# Patient Record
Sex: MALE | Race: WHITE | Employment: FULL TIME | URBAN - METROPOLITAN AREA
[De-identification: names, ages, dates, MRNs, and addresses within clinical notes are randomized per-mention and may not be internally consistent; named-entity substitution may affect disease eponyms.]

---

## 2018-03-15 ENCOUNTER — APPOINTMENT (OUTPATIENT)
Dept: GENERAL RADIOLOGY | Age: 48
End: 2018-03-15
Payer: COMMERCIAL

## 2018-03-15 ENCOUNTER — HOSPITAL ENCOUNTER (EMERGENCY)
Age: 48
Discharge: HOME | End: 2018-03-15
Attending: EMERGENCY MEDICINE
Payer: COMMERCIAL

## 2018-03-15 VITALS
OXYGEN SATURATION: 97 % | BODY MASS INDEX: 25.06 KG/M2 | DIASTOLIC BLOOD PRESSURE: 99 MMHG | SYSTOLIC BLOOD PRESSURE: 165 MMHG | RESPIRATION RATE: 16 BRPM | HEART RATE: 99 BPM | HEIGHT: 72 IN | TEMPERATURE: 98.2 F | WEIGHT: 185 LBS

## 2018-03-15 DIAGNOSIS — S60.022A CONTUSION OF LEFT INDEX FINGER WITHOUT DAMAGE TO NAIL, INITIAL ENCOUNTER: Primary | ICD-10-CM

## 2018-03-15 PROCEDURE — 6370000000 HC RX 637 (ALT 250 FOR IP): Performed by: EMERGENCY MEDICINE

## 2018-03-15 PROCEDURE — 99283 EMERGENCY DEPT VISIT LOW MDM: CPT

## 2018-03-15 PROCEDURE — 73130 X-RAY EXAM OF HAND: CPT

## 2018-03-15 RX ORDER — HYDROCODONE BITARTRATE AND ACETAMINOPHEN 5; 325 MG/1; MG/1
1 TABLET ORAL ONCE
Status: COMPLETED | OUTPATIENT
Start: 2018-03-15 | End: 2018-03-15

## 2018-03-15 RX ADMIN — HYDROCODONE BITARTRATE AND ACETAMINOPHEN 1 TABLET: 5; 325 TABLET ORAL at 07:32

## 2018-03-15 ASSESSMENT — PAIN DESCRIPTION - ORIENTATION: ORIENTATION: LEFT

## 2018-03-15 ASSESSMENT — PAIN DESCRIPTION - PAIN TYPE: TYPE: ACUTE PAIN

## 2018-03-15 ASSESSMENT — PAIN DESCRIPTION - ONSET: ONSET: ON-GOING

## 2018-03-15 ASSESSMENT — PAIN SCALES - GENERAL
PAINLEVEL_OUTOF10: 8
PAINLEVEL_OUTOF10: 8

## 2018-03-15 ASSESSMENT — PAIN DESCRIPTION - LOCATION: LOCATION: HAND

## 2018-03-15 ASSESSMENT — PAIN DESCRIPTION - DESCRIPTORS: DESCRIPTORS: THROBBING

## 2018-03-15 NOTE — ED NOTES
Finger splint applied to the patients injured left index finger, with tape.      Ciara Kelly, HENRIETTA  03/15/18 [id number]

## 2018-03-18 ENCOUNTER — HOSPITAL ENCOUNTER (EMERGENCY)
Age: 48
Discharge: HOME | End: 2018-03-18
Attending: EMERGENCY MEDICINE
Payer: COMMERCIAL

## 2018-03-18 VITALS
OXYGEN SATURATION: 97 % | SYSTOLIC BLOOD PRESSURE: 132 MMHG | DIASTOLIC BLOOD PRESSURE: 88 MMHG | HEART RATE: 98 BPM | BODY MASS INDEX: 25.06 KG/M2 | RESPIRATION RATE: 14 BRPM | WEIGHT: 185 LBS | HEIGHT: 72 IN | TEMPERATURE: 98.7 F

## 2018-03-18 DIAGNOSIS — L08.9 INFECTED FINGER: Primary | ICD-10-CM

## 2018-03-18 PROCEDURE — 97597 DBRDMT OPN WND 1ST 20 CM/<: CPT

## 2018-03-18 PROCEDURE — 99282 EMERGENCY DEPT VISIT SF MDM: CPT

## 2018-03-18 RX ORDER — CEPHALEXIN 500 MG/1
500 CAPSULE ORAL 4 TIMES DAILY
Qty: 40 CAPSULE | Refills: 0 | Status: SHIPPED | OUTPATIENT
Start: 2018-03-18 | End: 2018-03-28

## 2018-03-18 RX ORDER — DIAPER,BRIEF,INFANT-TODD,DISP
EACH MISCELLANEOUS
Status: DISCONTINUED
Start: 2018-03-18 | End: 2018-03-18 | Stop reason: WASHOUT

## 2018-03-18 ASSESSMENT — PAIN SCALES - GENERAL: PAINLEVEL_OUTOF10: 1

## 2018-03-18 ASSESSMENT — PAIN DESCRIPTION - ORIENTATION: ORIENTATION: LEFT

## 2018-03-18 NOTE — ED PROVIDER NOTES
HPI:  3/18/18,   Time: 2:25 PM         Cherelle Ngo is a 56 y.o. male presenting to the ED for pain and swelling of the left index finger, beginning 1-2d ago. The complaint has been persistent, severe in severity, and worsened by movement of the finger. The patient states that he sustained a direct blow to the left index finger while at work 4 d ago. He was seen that same day at an urgent care center and an x-ray showed no fractures he has kept the finger dressed since then and he noticed that the finger has become more swollen and painful    ROS:   Pertinent positives and negatives are stated within HPI, all other systems reviewed and are negative.  --------------------------------------------- PAST HISTORY ---------------------------------------------  Past Medical History:  has a past medical history of Hernia. Past Surgical History:  has a past surgical history that includes Mandible fracture surgery. Social History:  reports that he has been smoking. He has a 27.00 pack-year smoking history. He uses smokeless tobacco. He reports that he drinks alcohol. He reports that he uses drugs, including Marijuana. Family History: family history is not on file. The patients home medications have been reviewed. Allergies: Patient has no known allergies. -------------------------------------------------- RESULTS -------------------------------------------------  All laboratory and radiology results have been personally reviewed by myself   LABS:  No results found for this visit on 03/18/18. RADIOLOGY:  Interpreted by Radiologist.  No orders to display       ------------------------- NURSING NOTES AND VITALS REVIEWED ---------------------------   The nursing notes within the ED encounter and vital signs as below have been reviewed.    /88   Pulse 98   Temp 98.7 °F (37.1 °C) (Oral)   Resp 14   Ht 6' (1.829 m)   Wt 185 lb (83.9 kg)   SpO2 97%   BMI 25.09 kg/m²   Oxygen Saturation Interpretation: Normal      ---------------------------------------------------PHYSICAL EXAM--------------------------------------      Constitutional/General: Alert and oriented x3, well appearing, non toxic in NAD. The patient is a  and he works with greasy material and his hands have a lot of greasy dirt on them embedded into the skin folds and nail folds  Head: NC/AT  Eyes: PERRL, EOMI    Extremities: Moves all extremities x 4. Warm and well perfused. There is decreased range of motion of the left index finger secondary to pain and there is significant swelling of the dorsum of the left index finger from the PIP joint to the fingernail. The skin is blanched and it is fluctuant with significant purulent drainage from a small hole in the skin  Skin: warm and dry without rash  Neurologic: GCS 15,  Psych: Normal Affect      ------------------------------ ED COURSE/MEDICAL DECISION MAKING----------------------  Medications - No data to display      Medical Decision Making: The devitalized tissue on the dorsum of the left index finger from the PIP joint to the fingernail was debrided and a significant amount of pus was obtained. A sterile dressing and a finger splint were applied and the patient was discharged on oral antibiotics and referred to hand surgery for follow-up    Counseling: The emergency provider has spoken with the patient and discussed todays results, in addition to providing specific details for the plan of care and counseling regarding the diagnosis and prognosis. Questions are answered at this time and they are agreeable with the plan.      --------------------------------- IMPRESSION AND DISPOSITION ---------------------------------    IMPRESSION  1.  Infected finger        DISPOSITION  Disposition: Discharge to home  Patient condition is stable                  Tawana Nam MD  03/18/18 [id number]

## 2018-03-22 ENCOUNTER — TELEPHONE (OUTPATIENT)
Dept: ORTHOPEDIC SURGERY | Age: 48
End: 2018-03-22

## 2018-03-23 ENCOUNTER — OFFICE VISIT (OUTPATIENT)
Dept: ORTHOPEDIC SURGERY | Age: 48
End: 2018-03-23
Payer: COMMERCIAL

## 2018-03-23 VITALS — HEART RATE: 84 BPM | RESPIRATION RATE: 18 BRPM | BODY MASS INDEX: 25.06 KG/M2 | WEIGHT: 185 LBS | HEIGHT: 72 IN

## 2018-03-23 DIAGNOSIS — S67.191A CRUSHING INJURY OF LEFT INDEX FINGER, INITIAL ENCOUNTER: ICD-10-CM

## 2018-03-23 PROCEDURE — 99203 OFFICE O/P NEW LOW 30 MIN: CPT | Performed by: ORTHOPAEDIC SURGERY

## 2018-03-23 NOTE — PROGRESS NOTES
Department of Orthopedic Trauma Surgery  Office History & Physical Exam      CHIEF COMPLAINT:   Chief Complaint   Patient presents with    Hand Pain     finger feels numb   changes dressing daily       HISTORY OF PRESENT ILLNESS:                The patient is a 56 y.o. male who presents with left index finger injury on 3/15/18 while at work. Pt is a  and works around metal.  He states he smashed his hand on a metal table at work. He went to the ED on 3/15 and was placed on antibiotics. He returned to urgent care on 3/18/18 and the dorsum of his index finger was debrided in ER with expression of purulence. Pt complains of mild numbness to index finger. Pain with movement. Denies pain to the palmar side of finger. Past Medical History:        Diagnosis Date    Hernia      Past Surgical History:        Procedure Laterality Date    MANDIBLE FRACTURE SURGERY       Current Medications:   No current facility-administered medications for this visit. Allergies:  Patient has no known allergies. Social History:   TOBACCO:   reports that he has been smoking. He has a 27.00 pack-year smoking history. He uses smokeless tobacco.  ETOH:   reports that he drinks alcohol. DRUGS:   reports that he uses drugs, including Marijuana. ACTIVITIES OF DAILY LIVING:    OCCUPATION:    Family History:   History reviewed. No pertinent family history. REVIEW OF SYSTEMS:   Skin: no abnormal pigmentation, rash  Eyes: no blurring or eye pain   Ears/Nose/Throat: no hearing loss, tinnitus  Respiratory: No increased work of breathing, no coughing  Cardiovascular: Brisk capillary refill bilaterally, well perfused extremities  Gastrointestinal: no nausea, vomiting  Neurologic: no paralysis, or seizures  Musculoskeletal: Pain,      PHYSICAL EXAM:    VITALS:  Pulse 84   Resp 18   Ht 6' (1.829 m)   Wt 185 lb (83.9 kg)   BMI 25.09 kg/m²   Constitutional: Oriented to person, place, and time;  Answer questions

## 2018-03-23 NOTE — LETTER
[address]  [address] [place] [address]  Phone: [phone]  Fax: [phone] Schedule        March 23, 2018     Patient: Pj Rice   YOB: 1970   Date of Visit: 3/23/2018       To Whom It May Concern: It is my medical opinion that Pj Rice may return to work on 3/26/18. If you have any questions or concerns, please don't hesitate to call.     Sincerely,        Will Beucler, DO  [facility] Schedule

## 2018-03-26 PROBLEM — S67.191A CRUSHING INJURY OF LEFT INDEX FINGER: Status: ACTIVE | Noted: 2018-03-26

## 2018-03-29 ENCOUNTER — OFFICE VISIT (OUTPATIENT)
Dept: ORTHOPEDIC SURGERY | Age: 48
End: 2018-03-29
Payer: COMMERCIAL

## 2018-03-29 VITALS
HEART RATE: 82 BPM | RESPIRATION RATE: 18 BRPM | SYSTOLIC BLOOD PRESSURE: 131 MMHG | DIASTOLIC BLOOD PRESSURE: 84 MMHG | HEIGHT: 72 IN | TEMPERATURE: 97.4 F | WEIGHT: 185 LBS | BODY MASS INDEX: 25.06 KG/M2

## 2018-03-29 DIAGNOSIS — S67.191D CRUSHING INJURY OF LEFT INDEX FINGER, SUBSEQUENT ENCOUNTER: Primary | ICD-10-CM

## 2018-03-29 PROCEDURE — 99212 OFFICE O/P EST SF 10 MIN: CPT | Performed by: PHYSICIAN ASSISTANT

## 2018-03-29 PROCEDURE — 99212 OFFICE O/P EST SF 10 MIN: CPT

## 2018-03-29 NOTE — LETTER
[address] [facility]  [facility] [address]  Phone: [phone]  Fax: [phone] [place]        March 29, 2018     Patient: Amol Agarwal   YOB: 1970   Date of Visit: 3/29/2018       To Whom It May Concern: It is my medical opinion that Amol Agarwal may return to work immediately light duty with no use left hand. Must keep wound clean and dry. We will see him back in 2 weeks to further evaluate. If you have any questions or concerns, please don't hesitate to call.     Sincerely,        MAY Bernabe

## 2018-03-29 NOTE — PROGRESS NOTES
today        1. Crushing injury of left index finger, subsequent encounter       Plan:  Keep wound clean and dry. Clean wound daily with normal saline and then apply light antibiotic ointment until healed + dry sterile dressing daily and as needed  Okay to get wound wet. No soaks in tub. Pat dry. Work on range of motion of finger  Light duty at work - note given  F/U 2 weeks for a wound check only. No XR.

## 2018-04-11 ENCOUNTER — OFFICE VISIT (OUTPATIENT)
Dept: ORTHOPEDIC SURGERY | Age: 48
End: 2018-04-11
Payer: COMMERCIAL

## 2018-04-11 VITALS
WEIGHT: 185 LBS | BODY MASS INDEX: 25.06 KG/M2 | HEART RATE: 88 BPM | RESPIRATION RATE: 18 BRPM | HEIGHT: 72 IN | TEMPERATURE: 98.5 F

## 2018-04-11 DIAGNOSIS — S67.191D CRUSHING INJURY OF LEFT INDEX FINGER, SUBSEQUENT ENCOUNTER: Primary | ICD-10-CM

## 2018-04-11 PROCEDURE — 99213 OFFICE O/P EST LOW 20 MIN: CPT | Performed by: NURSE PRACTITIONER

## 2018-07-01 ENCOUNTER — APPOINTMENT (OUTPATIENT)
Dept: GENERAL RADIOLOGY | Age: 48
DRG: 751 | End: 2018-07-01
Payer: COMMERCIAL

## 2018-07-01 ENCOUNTER — APPOINTMENT (OUTPATIENT)
Dept: CT IMAGING | Age: 48
DRG: 751 | End: 2018-07-01
Payer: COMMERCIAL

## 2018-07-01 ENCOUNTER — HOSPITAL ENCOUNTER (INPATIENT)
Age: 48
LOS: 1 days | Discharge: PSYCHIATRIC OR SUBSTANCE USE FACILITY | DRG: 751 | End: 2018-07-02
Attending: EMERGENCY MEDICINE | Admitting: HOSPITALIST
Payer: COMMERCIAL

## 2018-07-01 DIAGNOSIS — F10.932 ALCOHOL WITHDRAWAL SYNDROME WITH PERCEPTUAL DISTURBANCE (HCC): Primary | ICD-10-CM

## 2018-07-01 LAB
ACETAMINOPHEN LEVEL: <5 MCG/ML (ref 10–30)
ALBUMIN SERPL-MCNC: 4.6 G/DL (ref 3.5–5.2)
ALP BLD-CCNC: 70 U/L (ref 40–129)
ALT SERPL-CCNC: 38 U/L (ref 0–40)
AMPHETAMINE SCREEN, URINE: NOT DETECTED
ANION GAP SERPL CALCULATED.3IONS-SCNC: 15 MMOL/L (ref 7–16)
AST SERPL-CCNC: 18 U/L (ref 0–39)
BACTERIA: ABNORMAL /HPF
BARBITURATE SCREEN URINE: NOT DETECTED
BENZODIAZEPINE SCREEN, URINE: NOT DETECTED
BILIRUB SERPL-MCNC: 0.2 MG/DL (ref 0–1.2)
BILIRUBIN URINE: NEGATIVE
BLOOD, URINE: ABNORMAL
BUN BLDV-MCNC: 11 MG/DL (ref 6–20)
CALCIUM SERPL-MCNC: 9.6 MG/DL (ref 8.6–10.2)
CANNABINOID SCREEN URINE: NOT DETECTED
CHLORIDE BLD-SCNC: 102 MMOL/L (ref 98–107)
CLARITY: CLEAR
CO2: 24 MMOL/L (ref 22–29)
COCAINE METABOLITE SCREEN URINE: NOT DETECTED
COLOR: YELLOW
CREAT SERPL-MCNC: 0.8 MG/DL (ref 0.7–1.2)
ETHANOL: <10 MG/DL (ref 0–0.08)
GFR AFRICAN AMERICAN: >60
GFR NON-AFRICAN AMERICAN: >60 ML/MIN/1.73
GLUCOSE BLD-MCNC: 107 MG/DL (ref 74–109)
GLUCOSE URINE: NEGATIVE MG/DL
HCT VFR BLD CALC: 39.6 % (ref 37–54)
HEMOGLOBIN: 13.8 G/DL (ref 12.5–16.5)
KETONES, URINE: NEGATIVE MG/DL
LEUKOCYTE ESTERASE, URINE: NEGATIVE
MCH RBC QN AUTO: 33.6 PG (ref 26–35)
MCHC RBC AUTO-ENTMCNC: 34.8 % (ref 32–34.5)
MCV RBC AUTO: 96.4 FL (ref 80–99.9)
METHADONE SCREEN, URINE: NOT DETECTED
NITRITE, URINE: NEGATIVE
OPIATE SCREEN URINE: NOT DETECTED
PDW BLD-RTO: 13.4 FL (ref 11.5–15)
PH UA: 6 (ref 5–9)
PHENCYCLIDINE SCREEN URINE: NOT DETECTED
PLATELET # BLD: 324 E9/L (ref 130–450)
PMV BLD AUTO: 11 FL (ref 7–12)
POTASSIUM SERPL-SCNC: 4.1 MMOL/L (ref 3.5–5)
PROPOXYPHENE SCREEN: NOT DETECTED
PROTEIN UA: NEGATIVE MG/DL
RBC # BLD: 4.11 E12/L (ref 3.8–5.8)
RBC UA: ABNORMAL /HPF (ref 0–2)
SALICYLATE, SERUM: <0.3 MG/DL (ref 0–30)
SODIUM BLD-SCNC: 141 MMOL/L (ref 132–146)
SPECIFIC GRAVITY UA: 1.01 (ref 1–1.03)
TOTAL PROTEIN: 7.5 G/DL (ref 6.4–8.3)
TRICYCLIC ANTIDEPRESSANTS SCREEN SERUM: NEGATIVE NG/ML
TROPONIN: <0.01 NG/ML (ref 0–0.03)
TSH SERPL DL<=0.05 MIU/L-ACNC: 1.73 UIU/ML (ref 0.27–4.2)
UROBILINOGEN, URINE: 0.2 E.U./DL
WBC # BLD: 13.9 E9/L (ref 4.5–11.5)
WBC UA: ABNORMAL /HPF (ref 0–5)

## 2018-07-01 PROCEDURE — 36415 COLL VENOUS BLD VENIPUNCTURE: CPT

## 2018-07-01 PROCEDURE — 99285 EMERGENCY DEPT VISIT HI MDM: CPT

## 2018-07-01 PROCEDURE — 71045 X-RAY EXAM CHEST 1 VIEW: CPT

## 2018-07-01 PROCEDURE — 84443 ASSAY THYROID STIM HORMONE: CPT

## 2018-07-01 PROCEDURE — 80053 COMPREHEN METABOLIC PANEL: CPT

## 2018-07-01 PROCEDURE — 80307 DRUG TEST PRSMV CHEM ANLYZR: CPT

## 2018-07-01 PROCEDURE — 81001 URINALYSIS AUTO W/SCOPE: CPT

## 2018-07-01 PROCEDURE — 85027 COMPLETE CBC AUTOMATED: CPT

## 2018-07-01 PROCEDURE — 70450 CT HEAD/BRAIN W/O DYE: CPT

## 2018-07-01 PROCEDURE — G0480 DRUG TEST DEF 1-7 CLASSES: HCPCS

## 2018-07-01 PROCEDURE — 84484 ASSAY OF TROPONIN QUANT: CPT

## 2018-07-01 PROCEDURE — 6370000000 HC RX 637 (ALT 250 FOR IP): Performed by: EMERGENCY MEDICINE

## 2018-07-01 PROCEDURE — 2140000000 HC CCU INTERMEDIATE R&B

## 2018-07-01 RX ORDER — LORAZEPAM 2 MG/ML
4 INJECTION INTRAMUSCULAR
Status: DISCONTINUED | OUTPATIENT
Start: 2018-07-01 | End: 2018-07-02 | Stop reason: HOSPADM

## 2018-07-01 RX ORDER — ONDANSETRON 2 MG/ML
4 INJECTION INTRAMUSCULAR; INTRAVENOUS EVERY 6 HOURS PRN
Status: DISCONTINUED | OUTPATIENT
Start: 2018-07-01 | End: 2018-07-02 | Stop reason: HOSPADM

## 2018-07-01 RX ORDER — LORAZEPAM 1 MG/1
4 TABLET ORAL
Status: DISCONTINUED | OUTPATIENT
Start: 2018-07-01 | End: 2018-07-02 | Stop reason: HOSPADM

## 2018-07-01 RX ORDER — SODIUM CHLORIDE 0.9 % (FLUSH) 0.9 %
10 SYRINGE (ML) INJECTION PRN
Status: DISCONTINUED | OUTPATIENT
Start: 2018-07-01 | End: 2018-07-01 | Stop reason: SDUPTHER

## 2018-07-01 RX ORDER — SODIUM CHLORIDE 0.9 % (FLUSH) 0.9 %
10 SYRINGE (ML) INJECTION PRN
Status: DISCONTINUED | OUTPATIENT
Start: 2018-07-01 | End: 2018-07-02 | Stop reason: HOSPADM

## 2018-07-01 RX ORDER — LORAZEPAM 2 MG/ML
2 INJECTION INTRAMUSCULAR
Status: DISCONTINUED | OUTPATIENT
Start: 2018-07-01 | End: 2018-07-02 | Stop reason: HOSPADM

## 2018-07-01 RX ORDER — CHLORDIAZEPOXIDE HYDROCHLORIDE 25 MG/1
50 CAPSULE, GELATIN COATED ORAL ONCE
Status: COMPLETED | OUTPATIENT
Start: 2018-07-01 | End: 2018-07-01

## 2018-07-01 RX ORDER — SODIUM CHLORIDE 0.9 % (FLUSH) 0.9 %
10 SYRINGE (ML) INJECTION EVERY 12 HOURS SCHEDULED
Status: DISCONTINUED | OUTPATIENT
Start: 2018-07-01 | End: 2018-07-01 | Stop reason: SDUPTHER

## 2018-07-01 RX ORDER — LORAZEPAM 1 MG/1
2 TABLET ORAL
Status: DISCONTINUED | OUTPATIENT
Start: 2018-07-01 | End: 2018-07-02 | Stop reason: HOSPADM

## 2018-07-01 RX ORDER — NICOTINE 21 MG/24HR
1 PATCH, TRANSDERMAL 24 HOURS TRANSDERMAL DAILY
Status: DISCONTINUED | OUTPATIENT
Start: 2018-07-02 | End: 2018-07-02 | Stop reason: HOSPADM

## 2018-07-01 RX ORDER — LORAZEPAM 2 MG/ML
3 INJECTION INTRAMUSCULAR
Status: DISCONTINUED | OUTPATIENT
Start: 2018-07-01 | End: 2018-07-02 | Stop reason: HOSPADM

## 2018-07-01 RX ORDER — SODIUM CHLORIDE 0.9 % (FLUSH) 0.9 %
10 SYRINGE (ML) INJECTION EVERY 12 HOURS SCHEDULED
Status: DISCONTINUED | OUTPATIENT
Start: 2018-07-01 | End: 2018-07-02 | Stop reason: HOSPADM

## 2018-07-01 RX ORDER — THIAMINE MONONITRATE (VIT B1) 100 MG
100 TABLET ORAL DAILY
Status: DISCONTINUED | OUTPATIENT
Start: 2018-07-02 | End: 2018-07-02 | Stop reason: HOSPADM

## 2018-07-01 RX ORDER — LORAZEPAM 2 MG/ML
1 INJECTION INTRAMUSCULAR
Status: DISCONTINUED | OUTPATIENT
Start: 2018-07-01 | End: 2018-07-02 | Stop reason: HOSPADM

## 2018-07-01 RX ORDER — MULTIVITAMIN WITH FOLIC ACID 400 MCG
1 TABLET ORAL DAILY
Status: DISCONTINUED | OUTPATIENT
Start: 2018-07-02 | End: 2018-07-02 | Stop reason: HOSPADM

## 2018-07-01 RX ORDER — LORAZEPAM 1 MG/1
3 TABLET ORAL
Status: DISCONTINUED | OUTPATIENT
Start: 2018-07-01 | End: 2018-07-02 | Stop reason: HOSPADM

## 2018-07-01 RX ORDER — LORAZEPAM 1 MG/1
1 TABLET ORAL
Status: DISCONTINUED | OUTPATIENT
Start: 2018-07-01 | End: 2018-07-02 | Stop reason: HOSPADM

## 2018-07-01 RX ORDER — FOLIC ACID 1 MG/1
1 TABLET ORAL DAILY
Status: DISCONTINUED | OUTPATIENT
Start: 2018-07-02 | End: 2018-07-02 | Stop reason: HOSPADM

## 2018-07-01 RX ADMIN — CHLORDIAZEPOXIDE HYDROCHLORIDE 50 MG: 25 CAPSULE ORAL at 20:13

## 2018-07-01 ASSESSMENT — PAIN SCALES - GENERAL: PAINLEVEL_OUTOF10: 0

## 2018-07-01 NOTE — ED PROVIDER NOTES
E12/L    Hemoglobin 13.8 12.5 - 16.5 g/dL    Hematocrit 39.6 37.0 - 54.0 %    MCV 96.4 80.0 - 99.9 fL    MCH 33.6 26.0 - 35.0 pg    MCHC 34.8 (H) 32.0 - 34.5 %    RDW 13.4 11.5 - 15.0 fL    Platelets 598 206 - 793 E9/L    MPV 11.0 7.0 - 12.0 fL   Comprehensive Metabolic Panel   Result Value Ref Range    Sodium 141 132 - 146 mmol/L    Potassium 4.1 3.5 - 5.0 mmol/L    Chloride 102 98 - 107 mmol/L    CO2 24 22 - 29 mmol/L    Anion Gap 15 7 - 16 mmol/L    Glucose 107 74 - 109 mg/dL    BUN 11 6 - 20 mg/dL    CREATININE 0.8 0.7 - 1.2 mg/dL    GFR Non-African American >60 >=60 mL/min/1.73    GFR African American >60     Calcium 9.6 8.6 - 10.2 mg/dL    Total Protein 7.5 6.4 - 8.3 g/dL    Alb 4.6 3.5 - 5.2 g/dL    Total Bilirubin 0.2 0.0 - 1.2 mg/dL    Alkaline Phosphatase 70 40 - 129 U/L    ALT 38 0 - 40 U/L    AST 18 0 - 39 U/L   Troponin   Result Value Ref Range    Troponin <0.01 0.00 - 0.03 ng/mL   Serum Drug Screen   Result Value Ref Range    Ethanol Lvl <10 mg/dL    Acetaminophen Level <5.0 (L) 10.0 - 71.3 mcg/mL    Salicylate, Serum <5.4 0.0 - 30.0 mg/dL    TCA Scrn NEGATIVE Cutoff:300 ng/mL   TSH without Reflex   Result Value Ref Range    TSH 1.730 0.270 - 4.200 uIU/mL   Urine Drug Screen   Result Value Ref Range    Amphetamine Screen, Urine NOT DETECTED Negative <1000 ng/mL    Barbiturate Screen, Ur NOT DETECTED Negative < 200 ng/mL    Benzodiazepine Screen, Urine NOT DETECTED Negative < 200 ng/mL    Cannabinoid Scrn, Ur NOT DETECTED Negative < 50ng/mL    COCAINE METABOLITE SCREEN URINE NOT DETECTED Negative < 300 ng/mL    Opiate Scrn, Ur NOT DETECTED Negative < 300ng/mL    PCP Scrn, Ur NOT DETECTED Negative < 25 ng/mL    Methadone Screen, Urine NOT DETECTED Negative <300 ng/mL    Propoxyphene Scrn, Ur NOT DETECTED Negative <300 ng/mL   Urinalysis   Result Value Ref Range    Color, UA Yellow Straw/Yellow    Clarity, UA Clear Clear    Glucose, Ur Negative Negative mg/dL    Bilirubin Urine Negative Negative The scribe's documentation has been prepared under my direction and personally reviewed by me in its entirety. I confirm that the note above accurately reflects all work, treatment, procedures, and medical decision making performed by me.         Alberta Aguilar,   07/01/18 1945

## 2018-07-01 NOTE — ED NOTES
Pt is aware of need for urine specimen and was only able to give scant specimen      Irena Lugo RN  07/01/18 5533

## 2018-07-02 ENCOUNTER — HOSPITAL ENCOUNTER (INPATIENT)
Age: 48
LOS: 4 days | Discharge: HOME | DRG: 751 | End: 2018-07-06
Attending: PSYCHIATRY & NEUROLOGY | Admitting: PSYCHIATRY & NEUROLOGY
Payer: COMMERCIAL

## 2018-07-02 VITALS
HEART RATE: 80 BPM | RESPIRATION RATE: 20 BRPM | DIASTOLIC BLOOD PRESSURE: 62 MMHG | SYSTOLIC BLOOD PRESSURE: 100 MMHG | TEMPERATURE: 98.5 F | OXYGEN SATURATION: 99 % | BODY MASS INDEX: 24.88 KG/M2 | WEIGHT: 183.7 LBS | HEIGHT: 72 IN

## 2018-07-02 LAB
ANION GAP SERPL CALCULATED.3IONS-SCNC: 13 MMOL/L (ref 7–16)
ANISOCYTOSIS: ABNORMAL
BASOPHILS ABSOLUTE: 0.06 E9/L (ref 0–0.2)
BASOPHILS RELATIVE PERCENT: 0.4 % (ref 0–2)
BUN BLDV-MCNC: 10 MG/DL (ref 6–20)
CALCIUM SERPL-MCNC: 9.3 MG/DL (ref 8.6–10.2)
CHLORIDE BLD-SCNC: 100 MMOL/L (ref 98–107)
CO2: 27 MMOL/L (ref 22–29)
CREAT SERPL-MCNC: 0.9 MG/DL (ref 0.7–1.2)
EOSINOPHILS ABSOLUTE: 0.33 E9/L (ref 0.05–0.5)
EOSINOPHILS RELATIVE PERCENT: 2.4 % (ref 0–6)
GFR AFRICAN AMERICAN: >60
GFR NON-AFRICAN AMERICAN: >60 ML/MIN/1.73
GLUCOSE BLD-MCNC: 90 MG/DL (ref 74–109)
HCT VFR BLD CALC: 38.8 % (ref 37–54)
HEMOGLOBIN: 13.7 G/DL (ref 12.5–16.5)
IMMATURE GRANULOCYTES #: 0.08 E9/L
IMMATURE GRANULOCYTES %: 0.6 % (ref 0–5)
LYMPHOCYTES ABSOLUTE: 4.93 E9/L (ref 1.5–4)
LYMPHOCYTES RELATIVE PERCENT: 35.4 % (ref 20–42)
MAGNESIUM: 2 MG/DL (ref 1.6–2.6)
MCH RBC QN AUTO: 33.7 PG (ref 26–35)
MCHC RBC AUTO-ENTMCNC: 35.3 % (ref 32–34.5)
MCV RBC AUTO: 95.6 FL (ref 80–99.9)
MONOCYTES ABSOLUTE: 1.53 E9/L (ref 0.1–0.95)
MONOCYTES RELATIVE PERCENT: 11 % (ref 2–12)
NEUTROPHILS ABSOLUTE: 7.01 E9/L (ref 1.8–7.3)
NEUTROPHILS RELATIVE PERCENT: 50.2 % (ref 43–80)
PDW BLD-RTO: 13.3 FL (ref 11.5–15)
PLATELET # BLD: 339 E9/L (ref 130–450)
PMV BLD AUTO: 11.5 FL (ref 7–12)
POTASSIUM REFLEX MAGNESIUM: 3.8 MMOL/L (ref 3.5–5)
RBC # BLD: 4.06 E12/L (ref 3.8–5.8)
SODIUM BLD-SCNC: 140 MMOL/L (ref 132–146)
WBC # BLD: 13.9 E9/L (ref 4.5–11.5)

## 2018-07-02 PROCEDURE — 2580000003 HC RX 258: Performed by: HOSPITALIST

## 2018-07-02 PROCEDURE — 1240000000 HC EMOTIONAL WELLNESS R&B

## 2018-07-02 PROCEDURE — 80048 BASIC METABOLIC PNL TOTAL CA: CPT

## 2018-07-02 PROCEDURE — 83735 ASSAY OF MAGNESIUM: CPT

## 2018-07-02 PROCEDURE — 36415 COLL VENOUS BLD VENIPUNCTURE: CPT

## 2018-07-02 PROCEDURE — 85025 COMPLETE CBC W/AUTO DIFF WBC: CPT

## 2018-07-02 PROCEDURE — 6370000000 HC RX 637 (ALT 250 FOR IP): Performed by: HOSPITALIST

## 2018-07-02 PROCEDURE — 6360000002 HC RX W HCPCS: Performed by: HOSPITALIST

## 2018-07-02 RX ORDER — FOLIC ACID 1 MG/1
1 TABLET ORAL DAILY
Status: CANCELLED | OUTPATIENT
Start: 2018-07-03

## 2018-07-02 RX ORDER — NICOTINE 21 MG/24HR
1 PATCH, TRANSDERMAL 24 HOURS TRANSDERMAL DAILY
Status: CANCELLED | OUTPATIENT
Start: 2018-07-03

## 2018-07-02 RX ORDER — THIAMINE MONONITRATE (VIT B1) 100 MG
100 TABLET ORAL DAILY
Status: CANCELLED | OUTPATIENT
Start: 2018-07-03

## 2018-07-02 RX ORDER — MULTIVITAMIN WITH FOLIC ACID 400 MCG
1 TABLET ORAL DAILY
Status: CANCELLED | OUTPATIENT
Start: 2018-07-03

## 2018-07-02 RX ADMIN — MULTIVITAMIN TABLET 1 TABLET: TABLET at 09:20

## 2018-07-02 RX ADMIN — FOLIC ACID 1 MG: 1 TABLET ORAL at 09:20

## 2018-07-02 RX ADMIN — Medication 100 MG: at 09:20

## 2018-07-02 RX ADMIN — Medication 10 ML: at 09:21

## 2018-07-02 ASSESSMENT — LIFESTYLE VARIABLES: HISTORY_ALCOHOL_USE: YES

## 2018-07-02 ASSESSMENT — PAIN SCALES - GENERAL
PAINLEVEL_OUTOF10: 0

## 2018-07-02 NOTE — PROGRESS NOTES
Hospitalist Progress Note      PCP: No primary care provider on file. Date of Admission: 7/1/2018    Chief Complaint: psychosis    Hospital Course:  He was pink slipped,  Has been drinking etoh, but he is psychotic , will dc to psych today    Subjective:  Talking randomly       Medications:  Reviewed    Infusion Medications   Scheduled Medications    sodium chloride flush  10 mL Intravenous 2 times per day    enoxaparin  40 mg Subcutaneous Daily    thiamine  100 mg Oral Daily    folic acid  1 mg Oral Daily    multivitamin  1 tablet Oral Daily    nicotine  1 patch Transdermal Daily     PRN Meds: sodium chloride flush, magnesium hydroxide, ondansetron, LORazepam **OR** LORazepam **OR** LORazepam **OR** LORazepam **OR** LORazepam **OR** LORazepam **OR** LORazepam **OR** LORazepam      Intake/Output Summary (Last 24 hours) at 07/02/18 1427  Last data filed at 07/02/18 1406   Gross per 24 hour   Intake             2356 ml   Output             1200 ml   Net             1156 ml       Exam:    /72   Pulse 101   Temp 99.1 °F (37.3 °C) (Temporal)   Resp 16   Ht 6' (1.829 m)   Wt 183 lb 11.2 oz (83.3 kg)   SpO2 98%   BMI 24.91 kg/m²           Gen: well developed  HEENT: NC/AT, moist mucous membranes, no oropharyngeal erythema or exudate  Neck: supple, trachea midline, no anterior cervical or SC LAD  Heart:  Normal s1/s2, RRR, no murmurs, gallops, or rubs. Lungs:  cta  bilaterally,  Abd: bowel sounds present, soft, nontender, nondistended, no masses  Extrem:  No clubbing, cyanosis,  No  edema  Skin: no rashes or lesions  Psych: A & O x3  Neuro: grossly intact, moves all four extremities.     Capillary Refill: Brisk,< 3 seconds   Peripheral Pulses: +2 palpable, equal bilaterally               Labs:   Recent Labs      07/01/18   0841  07/02/18   0553   WBC  13.9*  13.9*   HGB  13.8  13.7   HCT  39.6  38.8   PLT  324  339     Recent Labs      07/01/18   0841  07/02/18   0553   NA  141  140   K  4.1 3.8   CL  102  100   CO2  24  27   BUN  11  10   CREATININE  0.8  0.9   CALCIUM  9.6  9.3     Recent Labs      07/01/18   0841   AST  18   ALT  38   BILITOT  0.2   ALKPHOS  70     No results for input(s): INR in the last 72 hours. Recent Labs      07/01/18   0841   TROPONINI  <0.01     Recent Labs      07/01/18   0841   AST  18   ALT  38   BILITOT  0.2   ALKPHOS  70     No results for input(s): LACTA in the last 72 hours. No results found for: Sandeepshaw Quan  No results found for: AMMONIA    Assessment:    Active Hospital Problems    Diagnosis Date Noted    [facility] with perceptual disturbances [facility] [F10.232] 07/01/2018   psychosis    Plan:   To psych today   he is medically cleared  Electronically signed by Vince Arriaza DO on 7/2/2018 at 2:27 PM

## 2018-07-02 NOTE — PROGRESS NOTES
Notified Han Benjamin with [facility] line that patient medically cleared to psych. Faxed pink slip to [phone].

## 2018-07-02 NOTE — PROGRESS NOTES
Patient continues to walk rapidly in hallway. He continues to drink a cup of water everytime he passes the water fountain.   He is taking a shower now per his request.

## 2018-07-02 NOTE — H&P
atelectasis. 2. Vascular calcifications thoracic aorta. ASSESSMENT:    Active Hospital Problems    Diagnosis Date Noted    Alcohol withdrawal with perceptual disturbances [facility]) [F10.232] 07/01/2018       56 y.o. male presenting to the ED for psychiatric evaluation,   The pt arrived to the ED by his self complaining that he \"doesn't feel right\" and is having visual hallucinations. As of two weeks ago had undergone medical detox for eight days after years of daily drinking. He left the detox center one week ago. In the ED, he complains of feeling lightheaded and dizzy, and is not able to keep his balance when he walks. Patient denies any fever/chills, cough, congestion, chest pain, shortness of breath, edema, headache, visual disturbances, focal paresthesias, focal weakness, abdominal pain, nausea, vomiting, diarrhea, constipation, dysuria, hematuria, trauma, neck or back pain or other complaints at this time. Smokes, no meds   Ct head negative, cxr reviewed, pt denies pna , coughs/sob  UDS negative, ekg NSR, trop negative  Has been depressed    ETOH WD  HX ETOH ABUSE  DEPRESION    Admit  Was protocol  Psych c/s          DVT Prophylaxis: lovenox  Diet: DIET CARDIAC;  Code Status: Full Code    PT/OT Eval Status: NA    Dispo - ip       Mark Galloway MD    Thank you No primary care provider on file. for the opportunity to be involved in this patient's care. If you have any questions or concerns please feel free to contact me at [phone].

## 2018-07-02 NOTE — PROGRESS NOTES
Spoke with RN on psych regarding pt requesting to see Dr. Anne Marie Garcia. States he will let him know when he sees him.

## 2018-07-03 PROBLEM — F23 ACUTE PSYCHOSIS (HCC): Status: ACTIVE | Noted: 2018-07-03

## 2018-07-03 LAB
CHOLESTEROL, TOTAL: 224 MG/DL (ref 0–199)
HBA1C MFR BLD: 5.9 % (ref 4–5.6)
HDLC SERPL-MCNC: 59 MG/DL
LDL CHOLESTEROL CALCULATED: 146 MG/DL (ref 0–99)
TRIGL SERPL-MCNC: 94 MG/DL (ref 0–149)
VLDLC SERPL CALC-MCNC: 19 MG/DL

## 2018-07-03 PROCEDURE — 36415 COLL VENOUS BLD VENIPUNCTURE: CPT

## 2018-07-03 PROCEDURE — 6370000000 HC RX 637 (ALT 250 FOR IP): Performed by: PSYCHIATRY & NEUROLOGY

## 2018-07-03 PROCEDURE — 6370000000 HC RX 637 (ALT 250 FOR IP): Performed by: INTERNAL MEDICINE

## 2018-07-03 PROCEDURE — 1240000000 HC EMOTIONAL WELLNESS R&B

## 2018-07-03 PROCEDURE — 99222 1ST HOSP IP/OBS MODERATE 55: CPT | Performed by: PSYCHIATRY & NEUROLOGY

## 2018-07-03 PROCEDURE — 83036 HEMOGLOBIN GLYCOSYLATED A1C: CPT

## 2018-07-03 PROCEDURE — 80061 LIPID PANEL: CPT

## 2018-07-03 RX ORDER — BENZTROPINE MESYLATE 1 MG/ML
2 INJECTION INTRAMUSCULAR; INTRAVENOUS 2 TIMES DAILY PRN
Status: DISCONTINUED | OUTPATIENT
Start: 2018-07-03 | End: 2018-07-06 | Stop reason: HOSPADM

## 2018-07-03 RX ORDER — ARIPIPRAZOLE 2 MG/1
2 TABLET ORAL 2 TIMES DAILY
Status: DISCONTINUED | OUTPATIENT
Start: 2018-07-03 | End: 2018-07-04

## 2018-07-03 RX ORDER — FOLIC ACID 1 MG/1
1 TABLET ORAL DAILY
Status: DISCONTINUED | OUTPATIENT
Start: 2018-07-03 | End: 2018-07-06 | Stop reason: HOSPADM

## 2018-07-03 RX ORDER — NICOTINE 21 MG/24HR
1 PATCH, TRANSDERMAL 24 HOURS TRANSDERMAL DAILY
Status: DISCONTINUED | OUTPATIENT
Start: 2018-07-03 | End: 2018-07-06 | Stop reason: HOSPADM

## 2018-07-03 RX ORDER — OLANZAPINE 10 MG/1
10 TABLET ORAL
Status: ACTIVE | OUTPATIENT
Start: 2018-07-03 | End: 2018-07-03

## 2018-07-03 RX ORDER — HALOPERIDOL 5 MG/ML
10 INJECTION INTRAMUSCULAR EVERY 6 HOURS PRN
Status: DISCONTINUED | OUTPATIENT
Start: 2018-07-03 | End: 2018-07-06 | Stop reason: HOSPADM

## 2018-07-03 RX ORDER — MULTIVITAMIN WITH FOLIC ACID 400 MCG
1 TABLET ORAL DAILY
Status: DISCONTINUED | OUTPATIENT
Start: 2018-07-03 | End: 2018-07-06 | Stop reason: HOSPADM

## 2018-07-03 RX ORDER — THIAMINE MONONITRATE (VIT B1) 100 MG
100 TABLET ORAL DAILY
Status: DISCONTINUED | OUTPATIENT
Start: 2018-07-03 | End: 2018-07-06 | Stop reason: HOSPADM

## 2018-07-03 RX ORDER — MAGNESIUM HYDROXIDE/ALUMINUM HYDROXICE/SIMETHICONE 120; 1200; 1200 MG/30ML; MG/30ML; MG/30ML
30 SUSPENSION ORAL PRN
Status: DISCONTINUED | OUTPATIENT
Start: 2018-07-03 | End: 2018-07-06 | Stop reason: HOSPADM

## 2018-07-03 RX ORDER — HYDROXYZINE PAMOATE 50 MG/1
50 CAPSULE ORAL EVERY 6 HOURS PRN
Status: DISCONTINUED | OUTPATIENT
Start: 2018-07-03 | End: 2018-07-06 | Stop reason: HOSPADM

## 2018-07-03 RX ORDER — TRAZODONE HYDROCHLORIDE 50 MG/1
50 TABLET ORAL NIGHTLY PRN
Status: DISCONTINUED | OUTPATIENT
Start: 2018-07-03 | End: 2018-07-06 | Stop reason: HOSPADM

## 2018-07-03 RX ORDER — ACETAMINOPHEN 325 MG/1
650 TABLET ORAL EVERY 4 HOURS PRN
Status: DISCONTINUED | OUTPATIENT
Start: 2018-07-03 | End: 2018-07-06 | Stop reason: HOSPADM

## 2018-07-03 RX ADMIN — THIAMINE HCL TAB 100 MG 100 MG: 100 TAB at 13:01

## 2018-07-03 RX ADMIN — ARIPIPRAZOLE 2 MG: 2 TABLET ORAL at 20:35

## 2018-07-03 RX ADMIN — MULTIVITAMIN TABLET 1 TABLET: TABLET at 13:02

## 2018-07-03 RX ADMIN — FOLIC ACID 1 MG: 1 TABLET ORAL at 13:02

## 2018-07-03 RX ADMIN — ACETAMINOPHEN 650 MG: 325 TABLET, FILM COATED ORAL at 01:19

## 2018-07-03 RX ADMIN — TRAZODONE HYDROCHLORIDE 50 MG: 50 TABLET ORAL at 20:35

## 2018-07-03 RX ADMIN — ARIPIPRAZOLE 2 MG: 2 TABLET ORAL at 13:01

## 2018-07-03 ASSESSMENT — LIFESTYLE VARIABLES: HISTORY_ALCOHOL_USE: YES

## 2018-07-03 ASSESSMENT — SLEEP AND FATIGUE QUESTIONNAIRES
AVERAGE NUMBER OF SLEEP HOURS: 5
DO YOU HAVE DIFFICULTY SLEEPING: NO
DO YOU HAVE DIFFICULTY SLEEPING: NO
AVERAGE NUMBER OF SLEEP HOURS: 5
DO YOU USE A SLEEP AID: NO
DO YOU USE A SLEEP AID: NO

## 2018-07-03 ASSESSMENT — PATIENT HEALTH QUESTIONNAIRE - PHQ9
SUM OF ALL RESPONSES TO PHQ QUESTIONS 1-9: 9
SUM OF ALL RESPONSES TO PHQ QUESTIONS 1-9: 9

## 2018-07-03 ASSESSMENT — PAIN SCALES - GENERAL
PAINLEVEL_OUTOF10: 0
PAINLEVEL_OUTOF10: 7

## 2018-07-03 NOTE — PROGRESS NOTES
[facility]  Initial Interdisciplinary Treatment Plan NOTE    Review Date & Time: 7/3/18 10:48am    Patient was in treatment team    Admission Type:   Admission Type: Involuntary    Reason for admission:  Reason for Admission: seeing things      Estimated Length of Stay Update:  3-5 days  Estimated Discharge Date Update: Friday 7/6/18    PATIENT STRENGTHS:  Patient Strengths Strengths: No significant Physical Illness  Patient Strengths and Limitations:Limitations: Tendency to isolate self, Difficulty problem solving/relies on others to help solve problems, General negative or hopeless attitude about future/recovery  Addictive Behavior:Addictive Behavior  In the past 3 months, have you felt or has someone told you that you have a problem with:  : Internet Use  Do you have a history of Chemical Use?: No  Do you have a history of Alcohol Use?: Yes  Do you have a history of Street Drug Abuse?: No  Histroy of Prescripton Drug Abuse?: No  Medical Problems:  Past Medical History:   Diagnosis Date    Hernia        EDUCATION:   Learner Progress Toward Treatment Goals: Reviewed results and recommendations of this team, Reviewed group plan and strategies, Reviewed signs, symptoms and risk of self harm and violent behavior and Reviewed goals and plan of care    Method: Small group    Outcome: Verbalized understanding    PATIENT GOALS: no goal list    PLAN/TREATMENT RECOMMENDATIONS UPDATE:Start abilify, continue to monitor closely. GOALS UPDATE:   Time frame for Short-Term Goals: continue to monitor closely.  Encourage groups    Don Flores RN

## 2018-07-03 NOTE — PROGRESS NOTES
Group Therapy Note     Date: 7/3/2018  Start Time: 10:05 AM  End Time:  10:55 AM  Number of Participants: 12     Type of Group: Psychotherapy     Wellness Binder Information  Module Name:  n/a  Session Number:  n/a     Patient's Goal: To increase socialization and improve interpersonal relationships.     Notes: Pt was engaged in group through active listening. Status After Intervention:  Unchanged    Participation Level:  Active Listener    Participation Quality: Appropriate and Attentive      Speech:  normal      Thought Process/Content: Logical  Linear      Affective Functioning: Congruent      Mood: anxious and depressed      Level of consciousness:  Alert and Attentive      Response to Learning: Able to verbalize current knowledge/experience and Able to retain information      Endings: None Reported    Modes of Intervention: Support, Socialization and Exploration      Discipline Responsible: /Counselor      Signature:  Becky Mendoza

## 2018-07-03 NOTE — H&P
PRN  traZODone (DESYREL) tablet 50 mg, 50 mg, Oral, Nightly PRN  benztropine mesylate (COGENTIN) injection 2 mg, 2 mg, Intramuscular, BID PRN  magnesium hydroxide (MILK OF MAGNESIA) 400 MG/5ML suspension 30 mL, 30 mL, Oral, Daily PRN  aluminum & magnesium hydroxide-simethicone (MAALOX) 200-200-20 MG/5ML suspension 30 mL, 30 mL, Oral, PRN    Medical Review of Systems:     All other than marked systmes have been reviewed and are all negative. Constitutional Symptoms: []  fever []  Chills  Skin Symptoms: [] rash []  Pruritus   Eye Symptoms: [] Vision unchanged []  recent vision problems[] blurred vision   Respiratory Symptoms:[] shortness of breath [] cough  Cardiovascular Symptoms:  [] chest pain   [] palpitations   Gastrointestinal Symptoms: []  abdominal pain []  nausea []  vomiting []  diarrhea  Genitourinary Symptoms: []  dysuria  []  hematuria   Musculoskeletal Symptoms: []  back pain []  muscle pain []  joint pain  Neurologic Symptoms: []  headache []  dizziness  Hematolymphoid Symptoms: [] Adenopathy [] Bruises   [] Schimosis       VITALS: /70   Pulse 80   Temp 98.6 °F (37 °C) (Oral)   Resp 20   Ht 6' (1.829 m)   Wt 183 lb (83 kg)   BMI 24.82 kg/m²     ALLERGIES: Patient has no known allergies.             Physical Examination:    Head:  [x] Atraumatic:  [x] normocephalic  Skin and Mucosa       [] Moist [] Dry [] Pale [x] Normal   Neck: [x] Thyroid [] Palpable    [x] Not palpable []  venus distention [] adenopathy   Chest: [x] Clear [] Rhonchi  [] Wheezing   CV: [x] S1 [x] S2 [] No murmer   Abdomen:  [x] Soft   [] Tender  [] Viceromegaly   Extremities:  [x] No Edema   [] Edema    Cranial Nerves Examination:    CN II: [x] Pupils are reactive to light [] Pupils are non reactive to light  CN III, IV, VI:[x] No eye deviation  [] No diplopia or ptosis   CN V: [x] Facial Sensation is intact  [] Facial Sensation is not intact   CN IIIV:  [x] Hearing is normal to rubbing fingers   CN IX, X:  [x] Normal

## 2018-07-03 NOTE — PROGRESS NOTES
`[facility]  Admission Note     Admission Type:   Admission Type:  Involuntary    Reason for admission:  Reason for Admission: seeing things    PATIENT STRENGTHS:  Strengths: No significant Physical Illness    Patient Strengths and Limitations:  Limitations: Inappropriate/potentially harmful leisure interests    Addictive Behavior:   Addictive Behavior  In the past 3 months, have you felt or has someone told you that you have a problem with:  : None  Do you have a history of Chemical Use?: No  Do you have a history of Alcohol Use?: Yes  Do you have a history of Street Drug Abuse?: No  Histroy of Prescripton Drug Abuse?: No    Medical Problems:   Past Medical History:   Diagnosis Date    Hernia        Status EXAM:  Status and Exam  Normal: Yes  Facial Expression: Flat  Affect: Appropriate  Level of Consciousness: Alert  Mood:Normal: Yes  Motor Activity:Normal: Yes  Interview Behavior: Cooperative  Preception: Griffin to Person, Mary Little to Time, Griffin to Place, Griffin to Situation  Attention:Normal: Yes  Thought Content:Normal: Yes  Hallucinations: None  Delusions: No  Memory:Normal: Yes  Insight and Judgment: No  Insight and Judgment: Poor Judgment, Poor Insight  Present Suicidal Ideation: No  Present Homicidal Ideation: No    Tobacco Screening:  Practical Counseling, on admission, vandana X, if applicable and completed (first 3 are required if patient doesn't refuse):            (xx )  Recognizing danger situations (included triggers and roadblocks)                    ( x)  Coping skills (new ways to manage stress, exercise, relaxation techniques, changing routine, distraction)                                                           (x )  Basic information about quitting (benefits of quitting, techniques in how to quit, available resources  ( x) Referral for counseling faxed to [facility]                                           ( ) Patient refused counseling  ( ) Patient has not smoked in the last 30 days    Metabolic Screening:    No results found for: LABA1C    No results for input(s): CHOL, TRIG, HDL, LDLCALC, LABVLDL in the last 72 hours. Body mass index is 24.82 kg/m². BP Readings from Last 2 Encounters:   07/03/18 110/70   07/02/18 100/62           Pt admitted with followings belongings:  Received admission packet: yes  Consents reviewed, signed  no.   Patient verbalize understanding of unit expectations   pt cooperative but unsure why he is here gave yes no answers did npt want to talk much denied any needs at this time               Mary Faith RN

## 2018-07-03 NOTE — PROGRESS NOTES
Nurse to nurse called to [facility] on [facility], all pertinent information passed along and questions answered.     Kentrell Crystal RN

## 2018-07-04 PROCEDURE — 1240000000 HC EMOTIONAL WELLNESS R&B

## 2018-07-04 PROCEDURE — 99232 SBSQ HOSP IP/OBS MODERATE 35: CPT | Performed by: PSYCHIATRY & NEUROLOGY

## 2018-07-04 PROCEDURE — 6370000000 HC RX 637 (ALT 250 FOR IP): Performed by: INTERNAL MEDICINE

## 2018-07-04 PROCEDURE — 6370000000 HC RX 637 (ALT 250 FOR IP): Performed by: PSYCHIATRY & NEUROLOGY

## 2018-07-04 RX ORDER — ARIPIPRAZOLE 10 MG/1
10 TABLET ORAL 2 TIMES DAILY
Status: DISCONTINUED | OUTPATIENT
Start: 2018-07-04 | End: 2018-07-05

## 2018-07-04 RX ADMIN — FOLIC ACID 1 MG: 1 TABLET ORAL at 08:36

## 2018-07-04 RX ADMIN — THIAMINE HCL TAB 100 MG 100 MG: 100 TAB at 08:36

## 2018-07-04 RX ADMIN — ACETAMINOPHEN 650 MG: 325 TABLET, FILM COATED ORAL at 01:06

## 2018-07-04 RX ADMIN — ARIPIPRAZOLE 2 MG: 2 TABLET ORAL at 08:36

## 2018-07-04 RX ADMIN — ARIPIPRAZOLE 10 MG: 10 TABLET ORAL at 20:37

## 2018-07-04 RX ADMIN — MULTIVITAMIN TABLET 1 TABLET: TABLET at 08:36

## 2018-07-04 ASSESSMENT — PAIN SCALES - GENERAL: PAINLEVEL_OUTOF10: 3

## 2018-07-04 NOTE — PLAN OF CARE
Problem: Altered Mood, Psychotic Behavior:  Goal: Able to verbalize decrease in frequency and intensity of hallucinations  Able to verbalize decrease in frequency and intensity of hallucinations   Outcome: Met This Shift    Goal: Able to verbalize reality based thinking  Able to verbalize reality based thinking   Outcome: Ongoing  Resting in room. Receptive to interaction with good eye contact. Vague in his responses. Denies harmful thoughts or hallucinations. States he feels sleepy and states he is now taking Abilify. Denies questions or concerns.  Will continue to monitor and assess

## 2018-07-04 NOTE — PLAN OF CARE
Problem: Altered Mood, Psychotic Behavior:  Goal: Able to verbalize decrease in frequency and intensity of hallucinations  Able to verbalize decrease in frequency and intensity of hallucinations   Outcome: Ongoing    Goal: Able to verbalize reality based thinking  Able to verbalize reality based thinking   Outcome: Ongoing      Comments: Pt. Denies SI, HI, and hallucinations this shift. Pt. Denies physical complaints currently.

## 2018-07-04 NOTE — PROGRESS NOTES
DATE OF SERVICE:     7/4/2018    Read Kaden seen today for the purpose of continuation of care. Nursing, social work reports, laboratory studies and vital signs are reviewed. Patient chief complaint today is:             [] Depression      [x] Anxiety        [x] Psychosis         [] Suicidal/Homicidal                         [] Delusions           [] Aggression          Subjective: Today patient states that he is \"sleeping good. \" Patient only slept 3 hours last night, denies any SI, HI, or AVH. Patient presents with rapid pressured speech, yet is pleasant and cooperative. Sleep:  [] Good [] Fair  [x] Poor  Appetite:  [] Good [x] Fair  [] Poor    Depression:  [] Mild [] Moderate [] Severe                [] Constant [] Sporadic     Anxiety: [] Mild [] Moderate [x] Severe    [x] Constant [] Sporadic     Delusions: [] Mild [] Moderate [x] Severe     [x] Constant [] Sporadic     [x] Paranoid [] Somatic [x] Grandiose     Hallucinations: [] Mild [] Moderate [] Severe     [] Constant [] Sporadic    [] Auditory  [] Visual [] Tactile       Suicidal: [] Constant [] Sporadic  Homicidal: [] Constant [] Sporadic    Unscheduled Medications     [] Patient Receiving Emergency Medications \" Chemical Restraint\"   [] Requesting PRN medications for anxiety    Medical Review of Systems:     All other than marked systmes have been reviewed and are all negative.     Constitutional Symptoms: []  fever []  Chills  Skin Symptoms: [] rash []  Pruritus   Eye Symptoms: [] Vision unchanged []  recent vision problems[] blurred vision   Respiratory Symptoms:[] shortness of breath [] cough  Cardiovascular Symptoms:  [] chest pain   [] palpitations   Gastrointestinal Symptoms: []  abdominal pain []  nausea []  vomiting []  diarrhea  Genitourinary Symptoms: []  dysuria  []  hematuria   Musculoskeletal Symptoms: []  back pain []  muscle pain []  joint pain  Neurologic Symptoms: []  headache []  dizziness  Hematolymphoid Symptoms: [] Adenopathy [] Bruises   [] Schimosis       Psychiatric Review of systems  Delusions:  [] Denies [] Endorses   Withdrawals:  [] Denies [] Endorses    Hallucinations: [] Denies [] Endorses    Extra Pyramidal Symptoms: [] Denies [] Endorses      /76   Pulse 54   Temp 97.7 °F (36.5 °C) (Oral)   Resp 14   Ht 6' (1.829 m)   Wt 183 lb (83 kg)   BMI 24.82 kg/m²     Mental Status Examination:    Cognition:      [x] Alert  [x] Awake  [x] Oriented  [x] Person  [x] Place [x] Time      [] drowsy  [] tired  [] lethargic  [] distractable  [] Other     Attention/Concentration:   [] Attentive  [x] Distracted        Memory Recent and Remote: [] Intact   [] Impaired [x] Partially Impaired     Language: [] Able to recognize and name objects          [] Unable to recognize and name Objects    Fund of Knowledge:  [] Poor []  Fair  [] Good    Speech: [] Normal  [] Soft  [] Slow  [x] Fast [x] Pressured            [x] Loud [] Dysarthria  [] Incoherent    Appearance: [] Well Groomed  [] Casual Dressed  [] Unkept  [x] Disheveled          [] Normal weight[] Thin  [] Overweight  [] Obese           Attitude: [] Positive  [] Hostile  [] Demanding  [] Guarded  [] Defensive         [x] Cooperative  []  Uncooperative      Behavior:  [x] Normal Gait  [] Walks with Assistance  [] Dayanara Chair    [] [facility]  [] In Hospital Bed  [] Sitting in Chair    Muscle-Skeletal:  [x] Normal Muscle Tone [] Muscle Atrophy       [] Abnormal Muscle Movement     Eye Contact:  [x] Good eye contact  [] Intermittent Eye Contact  [] Poor Eye Contact     Mood: [] Depressed  [x] Anxious  [] Irritated  [] Euthymic   [] Angry [x] Restless    Affect:  [] Congruent  [] Incongruent  [x] Labile  [] Constricted  [] Flat  [] Bizarre     Thought Process and Association:  [] Logical [] Illogical       [] Linear and Goal Directed  [] Tangential  [x] Circumstantial     Thought Content:  [] Denies [] Endorses [] Suicidal [] Homicidal  [x] Delusional      [x] Paranoid

## 2018-07-05 PROCEDURE — 99232 SBSQ HOSP IP/OBS MODERATE 35: CPT | Performed by: PSYCHIATRY & NEUROLOGY

## 2018-07-05 PROCEDURE — 6370000000 HC RX 637 (ALT 250 FOR IP): Performed by: INTERNAL MEDICINE

## 2018-07-05 PROCEDURE — 1240000000 HC EMOTIONAL WELLNESS R&B

## 2018-07-05 PROCEDURE — 6370000000 HC RX 637 (ALT 250 FOR IP): Performed by: PSYCHIATRY & NEUROLOGY

## 2018-07-05 RX ORDER — DIVALPROEX SODIUM 500 MG/1
1000 TABLET, EXTENDED RELEASE ORAL DAILY
Status: DISCONTINUED | OUTPATIENT
Start: 2018-07-05 | End: 2018-07-06 | Stop reason: HOSPADM

## 2018-07-05 RX ORDER — ARIPIPRAZOLE 15 MG/1
15 TABLET ORAL 2 TIMES DAILY
Status: DISCONTINUED | OUTPATIENT
Start: 2018-07-05 | End: 2018-07-06 | Stop reason: HOSPADM

## 2018-07-05 RX ADMIN — THIAMINE HCL TAB 100 MG 100 MG: 100 TAB at 09:12

## 2018-07-05 RX ADMIN — MULTIVITAMIN TABLET 1 TABLET: TABLET at 09:12

## 2018-07-05 RX ADMIN — ARIPIPRAZOLE 15 MG: 15 TABLET ORAL at 20:35

## 2018-07-05 RX ADMIN — DIVALPROEX SODIUM 1000 MG: 500 TABLET, EXTENDED RELEASE ORAL at 12:20

## 2018-07-05 RX ADMIN — ARIPIPRAZOLE 10 MG: 10 TABLET ORAL at 09:12

## 2018-07-05 RX ADMIN — FOLIC ACID 1 MG: 1 TABLET ORAL at 09:12

## 2018-07-05 ASSESSMENT — PAIN - FUNCTIONAL ASSESSMENT: PAIN_FUNCTIONAL_ASSESSMENT: 0-10

## 2018-07-05 NOTE — PROGRESS NOTES
Attended community meeting, shared goal for the day as to contact someone at [facility].

## 2018-07-05 NOTE — PLAN OF CARE
[address] [place] [address]  Day 3 Interdisciplinary Treatment Plan NOTE    Review Date & Time: 07/05/18    Patient was in treatment team    Admission Type:   Admission Type:  Involuntary    Reason for admission:  Reason for Admission: seeing things  Estimated Length of Stay Update:  5-7 days  Estimated Discharge Date Update: 07/09/18    PATIENT STRENGTHS:  Patient Strengths Strengths: No significant Physical Illness  Patient Strengths and Limitations:Limitations: Tendency to isolate self, Multiple barriers to leisure interests, Difficulty problem solving/relies on others to help solve problems  Addictive Behavior:Addictive Behavior  In the past 3 months, have you felt or has someone told you that you have a problem with:  : Internet Use  Do you have a history of Chemical Use?: No  Do you have a history of Alcohol Use?: Yes  Do you have a history of Street Drug Abuse?: No  Histroy of Prescripton Drug Abuse?: No  Medical Problems:  Past Medical History:   Diagnosis Date    Hernia        Risk:  Fall RiskTotal: 65  Chun Scale Chun Scale Score: 22  BVC Total: 0  Change in scores No. Changes to plan of Care  No    Status EXAM:   Status and Exam  Normal: No  Facial Expression: Flat  Affect: Blunt  Level of Consciousness: Alert  Mood:Normal: No  Mood: Ambivalent  Motor Activity:Normal: Yes  Interview Behavior: Cooperative  Preception: [place] to Person, [place] to Time, [place] to Place, [place] to Situation  Attention:Normal: No  Attention: Distractible  Thought Content:Normal: Yes  Hallucinations: None  Delusions: No  Memory:Normal: No  Memory: Poor Recent  Insight and Judgment: No  Insight and Judgment: Poor Judgment  Present Suicidal Ideation: No  Present Homicidal Ideation: No    Daily Assessment Last Entry:   Daily Sleep (WDL): Within Defined Limits         Patient Currently in Pain: No  Daily Nutrition (WDL): Within Defined Limits    Patient Monitoring:  Frequency of Checks: 4 times per hour, close    Psychiatric Symptoms:   Depression Symptoms  Depression Symptoms: Isolative  Anxiety Symptoms  Anxiety Symptoms: No problems reported or observed. Adriana Symptoms  Adriana Symptoms: No problems reported or observed. Psychosis Symptoms  Hallucination Type: No problems reported or observed. Delusion Type: No problems reported or observed. Suicide Risk CSSR-S:  1) Within the past month, have you wished you were dead or wished you could go to sleep and not wake up? : NO  2) Within the past month, have you actually had any thoughts of killing yourself? : NO  6)  Have you ever done anything, started to do anything, or prepared to do anything to end your life?: NO  Change in Result No Change in Plan of care No      EDUCATION:   Learner Progress Toward Treatment Goals: Reviewed results and recommendations of this team, Reviewed group plan and strategies, Reviewed signs, symptoms and risk of self harm and violent behavior and Reviewed goals and plan of care    Method: Individual    Outcome: Verbalized understanding and Demonstrated Understanding    PATIENT GOALS: \"Contact someone at [facility]"    PLAN/TREATMENT RECOMMENDATIONS UPDATE: Offer and encourage groups and provide emotional support.     GOALS UPDATE:   Time frame for Short-Term Goals: one week      Braydon Gutierrez RN

## 2018-07-05 NOTE — PROGRESS NOTES
Group Therapy Note    Date: 7/5/2018  Start Time: 1100  End Time:  1150  Number of Participants: 9    Type of Group: Psychoeducation    Wellness Binder Information  Module Name:  Procrastination   Session Number:  na    Patient's Goal: patient will be able to id definition of procrastination, id what habits he/she can identify as a procrastination. Will be able to identify actions that can prevent procrastinating. Notes: patient polite and engaged in group, sharing appropriately with others. Status After Intervention:  Improved    Participation Level:  Active Listener and Interactive    Participation Quality: Appropriate, Attentive, Sharing and Supportive      Speech:  normal      Thought Process/Content: Logical      Affective Functioning: Congruent      Mood: euthymic      Level of consciousness:  Alert, Oriented x4 and Attentive      Response to Learning: Able to verbalize/acknowledge new learning, Able to retain information and Progressing to goal      Endings: None Reported    Modes of Intervention: Education, Support, Socialization, Exploration and Problem-solving      Discipline Responsible: Psychoeducational Specialist      Signature:  Preston Brito

## 2018-07-05 NOTE — PROGRESS NOTES
Schimosis       Psychiatric Review of systems  Delusions:  [] Denies [] Endorses   Withdrawals:  [] Denies [] Endorses    Hallucinations: [] Denies [] Endorses    Extra Pyramidal Symptoms: [] Denies [] Endorses      /80   Pulse 60   Temp 97.9 °F (36.6 °C)   Resp 14   Ht 6' (1.829 m)   Wt 183 lb (83 kg)   BMI 24.82 kg/m²     Mental Status Examination:    Cognition:      [x] Alert  [x] Awake  [x] Oriented  [x] Person  [x] Place [x] Time      [] drowsy  [] tired  [] lethargic  [] distractable  [] Other     Attention/Concentration:   [] Attentive  [x] Distracted        Memory Recent and Remote: [x] Intact   [] Impaired [] Partially Impaired     Language: [] Able to recognize and name objects          [] Unable to recognize and name Objects    Fund of Knowledge:  [] Poor []  Fair  [] Good    Speech: [] Normal  [] Soft  [] Slow  [x] Fast [x] Pressured            [x] Loud [] Dysarthria  [] Incoherent    Appearance: [] Well Groomed  [] Casual Dressed  [x] Unkept  [] Disheveled          [] Normal weight[] Thin  [] Overweight  [] Obese           Attitude: [] Positive  [] Hostile  [] Demanding  [] Guarded  [] Defensive         [x] Cooperative  []  Uncooperative      Behavior:  [x] Normal Gait  [] Walks with Assistance  [] Dayanara Chair    [] Walks with Fausto Ruiz  [] In Hospital Bed  [] Sitting in Chair    Muscle-Skeletal:  [x] Normal Muscle Tone [] Muscle Atrophy       [] Abnormal Muscle Movement     Eye Contact:  [x] Good eye contact  [] Intermittent Eye Contact  [] Poor Eye Contact     Mood: [] Depressed  [x] Anxious  [] Irritated  [] Euthymic   [] Angry [x] Restless    Affect:  [] Congruent  [] Incongruent  [x] Labile  [] Constricted  [] Flat  [] Bizarre     Thought Process and Association:  [x] Logical [] Illogical       [] Linear and Goal Directed  [x] Tangential  [] Circumstantial     Thought Content:  [] Denies [] Endorses [] Suicidal [] Homicidal  [x] Delusional      [] Paranoid  [] Somatic  [x] Grandiose    Perception: [x]  None  [] Auditory   [] Visual  [] tactile   [] olfactory  [] Illusions         Insight: [] Intact  [] Fair  [x] Limited    Judgement:  [] Intact  [] Fair  [x] Limited      Assessment/Plan:        Patient Active Problem List   Diagnosis Code    Crushing injury of left index finger S67.191A    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    [] Worsening    []  At Baseline       Reason for more than one antipsychotic:  [x] N/A  [] 3 failed monotherapy(drugs tried):  [] Cross over to a new antipsychotic  [] Taper to monotherapy from polypharmacy  [] Augmentation of Clozapine therapy due to treatment resistance to single therapy      Signed:  Toño Cowart  7/5/2018  1:39 PM

## 2018-07-06 VITALS
RESPIRATION RATE: 14 BRPM | BODY MASS INDEX: 24.79 KG/M2 | TEMPERATURE: 98.4 F | HEART RATE: 82 BPM | HEIGHT: 72 IN | WEIGHT: 183 LBS | DIASTOLIC BLOOD PRESSURE: 90 MMHG | SYSTOLIC BLOOD PRESSURE: 128 MMHG

## 2018-07-06 PROCEDURE — 99238 HOSP IP/OBS DSCHRG MGMT 30/<: CPT | Performed by: PSYCHIATRY & NEUROLOGY

## 2018-07-06 PROCEDURE — 6370000000 HC RX 637 (ALT 250 FOR IP): Performed by: PSYCHIATRY & NEUROLOGY

## 2018-07-06 PROCEDURE — 6370000000 HC RX 637 (ALT 250 FOR IP): Performed by: INTERNAL MEDICINE

## 2018-07-06 RX ORDER — NICOTINE 21 MG/24HR
1 PATCH, TRANSDERMAL 24 HOURS TRANSDERMAL DAILY
Qty: 30 PATCH | Refills: 0 | Status: SHIPPED | OUTPATIENT
Start: 2018-07-07 | End: 2019-09-19 | Stop reason: ALTCHOICE

## 2018-07-06 RX ORDER — ARIPIPRAZOLE 15 MG/1
15 TABLET ORAL 2 TIMES DAILY
Qty: 30 TABLET | Refills: 0 | Status: SHIPPED | OUTPATIENT
Start: 2018-07-06 | End: 2019-09-19 | Stop reason: ALTCHOICE

## 2018-07-06 RX ORDER — DIVALPROEX SODIUM 500 MG/1
1000 TABLET, EXTENDED RELEASE ORAL DAILY
Qty: 30 TABLET | Refills: 0 | Status: SHIPPED | OUTPATIENT
Start: 2018-07-07 | End: 2019-09-19 | Stop reason: ALTCHOICE

## 2018-07-06 RX ADMIN — FOLIC ACID 1 MG: 1 TABLET ORAL at 09:15

## 2018-07-06 RX ADMIN — THIAMINE HCL TAB 100 MG 100 MG: 100 TAB at 09:15

## 2018-07-06 RX ADMIN — MULTIVITAMIN TABLET 1 TABLET: TABLET at 09:15

## 2018-07-06 RX ADMIN — ARIPIPRAZOLE 15 MG: 15 TABLET ORAL at 09:15

## 2018-07-06 RX ADMIN — DIVALPROEX SODIUM 1000 MG: 500 TABLET, EXTENDED RELEASE ORAL at 09:15

## 2018-07-06 NOTE — DISCHARGE SUMMARY
Alcohol withdrawal with perceptual disturbances (HCC) F10.232    Acute psychosis F23       Education and Follow-up:  Counseled:  [x] Patient     [] Family    [] Guardian      SignedSusen Riding   7/6/2018   1:43 PM

## 2019-09-19 ENCOUNTER — HOSPITAL ENCOUNTER (EMERGENCY)
Age: 49
Discharge: PSYCHIATRIC OR SUBSTANCE USE FACILITY | End: 2019-09-20
Attending: EMERGENCY MEDICINE
Payer: COMMERCIAL

## 2019-09-19 ENCOUNTER — HOSPITAL ENCOUNTER (OUTPATIENT)
Age: 49
Discharge: HOME | End: 2019-09-19
Payer: COMMERCIAL

## 2019-09-19 DIAGNOSIS — T50.904A DRUG OVERDOSE, UNDETERMINED INTENT, INITIAL ENCOUNTER: ICD-10-CM

## 2019-09-19 DIAGNOSIS — F19.10 POLYSUBSTANCE ABUSE (HCC): Primary | ICD-10-CM

## 2019-09-19 DIAGNOSIS — F10.11 HISTORY OF ALCOHOL ABUSE: ICD-10-CM

## 2019-09-19 LAB
ALBUMIN SERPL-MCNC: 4.3 G/DL (ref 3.5–5.2)
ALP BLD-CCNC: 71 U/L (ref 40–129)
ALT SERPL-CCNC: 23 U/L (ref 0–40)
AMPHETAMINE SCREEN, URINE: NOT DETECTED
ANION GAP SERPL CALCULATED.3IONS-SCNC: 14 MMOL/L (ref 7–16)
AST SERPL-CCNC: 23 U/L (ref 0–39)
BARBITURATE SCREEN URINE: NOT DETECTED
BASOPHILS ABSOLUTE: 0.02 E9/L (ref 0–0.2)
BASOPHILS RELATIVE PERCENT: 0.2 % (ref 0–2)
BENZODIAZEPINE SCREEN, URINE: NOT DETECTED
BILIRUB SERPL-MCNC: 0.2 MG/DL (ref 0–1.2)
BUN BLDV-MCNC: 10 MG/DL (ref 6–20)
CALCIUM SERPL-MCNC: 8.5 MG/DL (ref 8.6–10.2)
CANNABINOID SCREEN URINE: NOT DETECTED
CHLORIDE BLD-SCNC: 106 MMOL/L (ref 98–107)
CO2: 24 MMOL/L (ref 22–29)
COCAINE METABOLITE SCREEN URINE: NOT DETECTED
CREAT SERPL-MCNC: 0.7 MG/DL (ref 0.7–1.2)
EOSINOPHILS ABSOLUTE: 0.1 E9/L (ref 0.05–0.5)
EOSINOPHILS RELATIVE PERCENT: 1.1 % (ref 0–6)
GFR AFRICAN AMERICAN: >60
GFR NON-AFRICAN AMERICAN: >60 ML/MIN/1.73
GLUCOSE BLD-MCNC: 91 MG/DL (ref 74–99)
HCT VFR BLD CALC: 37.4 % (ref 37–54)
HEMOGLOBIN: 13 G/DL (ref 12.5–16.5)
IMMATURE GRANULOCYTES #: 0.03 E9/L
IMMATURE GRANULOCYTES %: 0.3 % (ref 0–5)
LACTIC ACID: 1.8 MMOL/L (ref 0.5–2.2)
LIPASE: 21 U/L (ref 13–60)
LYMPHOCYTES ABSOLUTE: 3.44 E9/L (ref 1.5–4)
LYMPHOCYTES RELATIVE PERCENT: 37.6 % (ref 20–42)
Lab: NORMAL
MCH RBC QN AUTO: 34 PG (ref 26–35)
MCHC RBC AUTO-ENTMCNC: 34.8 % (ref 32–34.5)
MCV RBC AUTO: 97.9 FL (ref 80–99.9)
METHADONE SCREEN, URINE: NOT DETECTED
MONOCYTES ABSOLUTE: 0.69 E9/L (ref 0.1–0.95)
MONOCYTES RELATIVE PERCENT: 7.5 % (ref 2–12)
NEUTROPHILS ABSOLUTE: 4.88 E9/L (ref 1.8–7.3)
NEUTROPHILS RELATIVE PERCENT: 53.3 % (ref 43–80)
OPIATE SCREEN URINE: NOT DETECTED
PDW BLD-RTO: 12.7 FL (ref 11.5–15)
PHENCYCLIDINE SCREEN URINE: NOT DETECTED
PLATELET # BLD: 309 E9/L (ref 130–450)
PMV BLD AUTO: 11 FL (ref 7–12)
POTASSIUM SERPL-SCNC: 3.5 MMOL/L (ref 3.5–5)
PROPOXYPHENE SCREEN: NOT DETECTED
RBC # BLD: 3.82 E12/L (ref 3.8–5.8)
REASON FOR REJECTION: NORMAL
REJECTED TEST: NORMAL
SODIUM BLD-SCNC: 144 MMOL/L (ref 132–146)
TOTAL PROTEIN: 6.9 G/DL (ref 6.4–8.3)
TROPONIN: <0.01 NG/ML (ref 0–0.03)
WBC # BLD: 9.2 E9/L (ref 4.5–11.5)

## 2019-09-19 PROCEDURE — 80307 DRUG TEST PRSMV CHEM ANLYZR: CPT

## 2019-09-19 PROCEDURE — 85025 COMPLETE CBC W/AUTO DIFF WBC: CPT

## 2019-09-19 PROCEDURE — 83690 ASSAY OF LIPASE: CPT

## 2019-09-19 PROCEDURE — 36415 COLL VENOUS BLD VENIPUNCTURE: CPT

## 2019-09-19 PROCEDURE — 93005 ELECTROCARDIOGRAM TRACING: CPT | Performed by: EMERGENCY MEDICINE

## 2019-09-19 PROCEDURE — A0428 BLS: HCPCS

## 2019-09-19 PROCEDURE — 84484 ASSAY OF TROPONIN QUANT: CPT

## 2019-09-19 PROCEDURE — 80053 COMPREHEN METABOLIC PANEL: CPT

## 2019-09-19 PROCEDURE — 83605 ASSAY OF LACTIC ACID: CPT

## 2019-09-19 PROCEDURE — A0425 GROUND MILEAGE: HCPCS

## 2019-09-19 PROCEDURE — G0480 DRUG TEST DEF 1-7 CLASSES: HCPCS

## 2019-09-19 PROCEDURE — 99285 EMERGENCY DEPT VISIT HI MDM: CPT

## 2019-09-20 VITALS
RESPIRATION RATE: 16 BRPM | DIASTOLIC BLOOD PRESSURE: 91 MMHG | WEIGHT: 195 LBS | BODY MASS INDEX: 26.41 KG/M2 | SYSTOLIC BLOOD PRESSURE: 134 MMHG | HEIGHT: 72 IN | OXYGEN SATURATION: 98 % | TEMPERATURE: 98 F | HEART RATE: 79 BPM

## 2019-09-20 LAB
ACETAMINOPHEN LEVEL: <5 MCG/ML (ref 10–30)
EKG ATRIAL RATE: 101 BPM
EKG ATRIAL RATE: 87 BPM
EKG P AXIS: 31 DEGREES
EKG P AXIS: 33 DEGREES
EKG P-R INTERVAL: 156 MS
EKG P-R INTERVAL: 160 MS
EKG Q-T INTERVAL: 386 MS
EKG Q-T INTERVAL: 398 MS
EKG QRS DURATION: 102 MS
EKG QRS DURATION: 96 MS
EKG QTC CALCULATION (BAZETT): 478 MS
EKG QTC CALCULATION (BAZETT): 500 MS
EKG R AXIS: 62 DEGREES
EKG R AXIS: 69 DEGREES
EKG T AXIS: 14 DEGREES
EKG T AXIS: 38 DEGREES
EKG VENTRICULAR RATE: 101 BPM
EKG VENTRICULAR RATE: 87 BPM
ETHANOL: 136 MG/DL (ref 0–0.08)
ETHANOL: 222 MG/DL (ref 0–0.08)
SALICYLATE, SERUM: <0.3 MG/DL (ref 0–30)
TRICYCLIC ANTIDEPRESSANTS SCREEN SERUM: NEGATIVE NG/ML

## 2019-09-20 PROCEDURE — 36415 COLL VENOUS BLD VENIPUNCTURE: CPT

## 2019-09-20 PROCEDURE — G0480 DRUG TEST DEF 1-7 CLASSES: HCPCS

## 2019-09-20 PROCEDURE — 93010 ELECTROCARDIOGRAM REPORT: CPT | Performed by: INTERNAL MEDICINE

## 2019-09-20 NOTE — ED PROVIDER NOTES
195 lb (88.5 kg)   SpO2 97%   BMI 26.45 kg/m² . Oxygen Saturation Interpretation: Normal      ---------------------------------------------------PHYSICAL EXAM--------------------------------------      Constitutional/General: Alert and oriented x3, well appearing, non toxic in NAD  Head: Normocephalic and atraumatic  Eyes: PERRL, EOMI  Mouth: Oropharynx clear, handling secretions, no trismus  Neck: Supple, full ROM, no JVD. Trachea midline  Pulmonary: Lungs clear to auscultation bilaterally, no wheezes, rales, or rhonchi. Not in respiratory distress  Cardiovascular:  Regular rate and rhythm, no murmurs, gallops, or rubs. 2+ distal pulses  Abdomen: Soft, non tender, non distended, no organomegaly, no masses no rebound tenderness or guarding no rigidity  Extremities: Moves all extremities x 4. Warm and well perfused  Skin: warm and dry without rash  Neurologic: GCS 15, cranial nerves II through XII grossly intact no focal deficits. Psych: Flattened Affect; no signs nor symptoms of psychosis evident at this time no suicidal declarations no homicidal declarations are being made currently.      ------------------------------ ED COURSE/MEDICAL DECISION MAKING----------------------  Medications - No data to display      ED COURSE:     Medical Decision Making:   Differential Diagnoses:  Drug overdose raises concern regarding possible suicide potential especially with polysubstance involvement. Patient does also have a documented history in the epic chart of previous substance abuse. Patient's actions places safety at risk for this reason pink slip psychiatric form was filled out on his behalf. I spoke with the emergency physician (Dr. Kerry Chun) at [facility] where the patient will be transferred for psychiatric stabilization/admission. EKG #1:  Interpreted by emergency department physician unless otherwise noted. Time:  20:05    Rate: 101  Rhythm: Sinus.   Interpretation: nonspecific ST and T waves findings, sinus tachycardia. Counseling: The emergency provider has spoken with the patient and discussed todays results, in addition to providing specific details for the plan of care and counseling regarding the diagnosis and prognosis. Questions are answered at this time and they are agreeable with the plan.      --------------------------------- IMPRESSION AND DISPOSITION ---------------------------------    IMPRESSION  1. Intentional drug overdose, initial encounter ([facility]    2. Polysubstance abuse ([facility]    3. History of alcohol abuse        DISPOSITION  Disposition: Transfer to [facility] to ED  Patient condition is guarded      NOTE: This report was transcribed using voice recognition software.  Every effort was made to ensure accuracy; however, inadvertent computerized transcription errors may be present       Hilario Shrestha MD  09/19/19 2030       Hilario Shrestha MD  09/19/19 8519

## 2022-03-21 ENCOUNTER — HOSPITAL ENCOUNTER (EMERGENCY)
Age: 52
Discharge: HOME | End: 2022-03-21

## 2022-03-21 ENCOUNTER — APPOINTMENT (OUTPATIENT)
Dept: GENERAL RADIOLOGY | Age: 52
End: 2022-03-21

## 2022-03-21 VITALS
WEIGHT: 205 LBS | DIASTOLIC BLOOD PRESSURE: 100 MMHG | OXYGEN SATURATION: 98 % | SYSTOLIC BLOOD PRESSURE: 160 MMHG | TEMPERATURE: 98.7 F | HEIGHT: 72 IN | RESPIRATION RATE: 18 BRPM | HEART RATE: 89 BPM | BODY MASS INDEX: 27.77 KG/M2

## 2022-03-21 DIAGNOSIS — S80.01XA CONTUSION OF RIGHT KNEE, INITIAL ENCOUNTER: Primary | ICD-10-CM

## 2022-03-21 DIAGNOSIS — M25.461 KNEE EFFUSION, RIGHT: ICD-10-CM

## 2022-03-21 PROCEDURE — 73562 X-RAY EXAM OF KNEE 3: CPT

## 2022-03-21 PROCEDURE — 99211 OFF/OP EST MAY X REQ PHY/QHP: CPT

## 2022-03-21 PROCEDURE — G0463 HOSPITAL OUTPT CLINIC VISIT: HCPCS

## 2022-03-21 RX ORDER — NAPROXEN 500 MG/1
500 TABLET ORAL 2 TIMES DAILY PRN
Qty: 28 TABLET | Refills: 0 | Status: SHIPPED | OUTPATIENT
Start: 2022-03-21

## 2022-03-21 ASSESSMENT — PAIN DESCRIPTION - ORIENTATION: ORIENTATION: RIGHT

## 2022-03-21 ASSESSMENT — PAIN - FUNCTIONAL ASSESSMENT: PAIN_FUNCTIONAL_ASSESSMENT: 0-10

## 2022-03-21 ASSESSMENT — PAIN DESCRIPTION - PAIN TYPE: TYPE: ACUTE PAIN

## 2022-03-21 ASSESSMENT — PAIN DESCRIPTION - LOCATION: LOCATION: KNEE

## 2022-03-21 ASSESSMENT — PAIN SCALES - GENERAL: PAINLEVEL_OUTOF10: 3

## 2022-03-21 NOTE — ED NOTES
Patient tested positive for saliva  Alcohol, per protocol Occupational Health notified. Occupational Health notified [employer] and someone will come to transport for further testing.       Amrik Shay, JEOVANY  14/26/88 7522

## 2022-03-21 NOTE — Clinical Note
Lele Crespo was seen and treated in our emergency department on 3/21/2022. He may return to work on 03/22/2022. If you have any questions or concerns, please don't hesitate to call.       Radha Welch, APRN - CNP

## 2022-03-21 NOTE — ED PROVIDER NOTES
HPI:  3/21/22,   Time: 10:36 AM EDT         April Saleem is a 49 y.o. male presenting to the ED for right knee pain, beginning 2 weeks ago. The complaint has been persistent, moderate in severity, and worsened by nothing. Presents for complaints of right knee pain which he states initially began 2 weeks ago after he struck the right knee while at work. He states he had ongoing pain since that time. States that the last few days the pain and swelling have progressively worsened. He is able to ambulate however has significant visible effusion at the right knee. He was at work today and was subsequently sent here for further evaluation. He denies any additional fall or injury since the injury 2 weeks ago. He has no prior history of knee injuries. ROS:   Pertinent positives and negatives are stated within HPI, all other systems reviewed and are negative.  --------------------------------------------- PAST HISTORY ---------------------------------------------  Past Medical History:  has a past medical history of Hernia. Past Surgical History:  has a past surgical history that includes Mandible fracture surgery. Social History:  reports that he has been smoking. He has a 27.00 pack-year smoking history. He uses smokeless tobacco. He reports current alcohol use. He reports current drug use. Drug: Marijuana Don Safer). Family History: family history is not on file. The patients home medications have been reviewed. Allergies: Patient has no known allergies. -------------------------------------------------- RESULTS -------------------------------------------------  All laboratory and radiology results have been personally reviewed by myself   LABS:  No results found for this visit on 03/21/22. RADIOLOGY:  Interpreted by Radiologist.  XR KNEE RIGHT (3 VIEWS)   Final Result   1. There is no fracture dislocation   2. Minimal degenerative changes seen within the medial compartment. ------------------------- NURSING NOTES AND VITALS REVIEWED ---------------------------   The nursing notes within the ED encounter and vital signs as below have been reviewed. BP (!) 160/100   Pulse 89   Temp 98.7 °F (37.1 °C) (Infrared)   Resp 18   Ht 6' (1.829 m)   Wt 205 lb (93 kg)   SpO2 98%   BMI 27.80 kg/m²   Oxygen Saturation Interpretation: Normal      ---------------------------------------------------PHYSICAL EXAM--------------------------------------      Constitutional/General: Alert and oriented x3, well appearing, non toxic in NAD  Head: NC/AT  Eyes: PERRL, EOMI  Mouth: Oropharynx clear, handling secretions, no trismus  Neck: Supple, full ROM, no meningeal signs  Pulmonary: Lungs clear to auscultation bilaterally, no wheezes, rales, or rhonchi. Not in respiratory distress  Cardiovascular:  Regular rate and rhythm, no murmurs, gallops, or rubs. 2+ distal pulses  Abdomen: Soft, non tender, non distended,   Extremities: Moves all extremities x 4. Warm and well perfused, tenderness on on palpation at the medial aspect of the right knee. There is a moderate joint effusion palpable on examination. He has no crepitus on exam.  Negative anterior drawer. No calf tenderness. No tenderness at the distal femur location. Skin: warm and dry without rash  Neurologic: GCS 15,  Psych: Normal Affect      ------------------------------ ED COURSE/MEDICAL DECISION MAKING----------------------  Medications - No data to display      Medical Decision Making:    Patient informed of negative x-ray results for any acute bony deformity. He does have a moderate effusion on physical examination Ace bandage will be applied encouraged use rest, ice, compression elevation anti-inflammatory medications and follow-up with occupational health. Counseling:    The emergency provider has spoken with the patient and discussed todays results, in addition to providing specific details for the plan of care and counseling regarding the diagnosis and prognosis. Questions are answered at this time and they are agreeable with the plan.      --------------------------------- IMPRESSION AND DISPOSITION ---------------------------------    IMPRESSION  1. Contusion of right knee, initial encounter    2.  Knee effusion, right        DISPOSITION  Disposition: Discharge to home  Patient condition is good                  DANNA Santos - JOSE ALBERTO  03/21/22 1038

## 2023-02-18 ENCOUNTER — HOSPITAL ENCOUNTER (EMERGENCY)
Age: 53
Discharge: LEFT BEFORE DISCHARGE | End: 2023-02-18
Attending: EMERGENCY MEDICINE
Payer: COMMERCIAL

## 2023-02-18 ENCOUNTER — APPOINTMENT (OUTPATIENT)
Dept: GENERAL RADIOLOGY | Age: 53
End: 2023-02-18

## 2023-02-18 ENCOUNTER — APPOINTMENT (OUTPATIENT)
Dept: CT IMAGING | Age: 53
End: 2023-02-18

## 2023-02-18 VITALS
SYSTOLIC BLOOD PRESSURE: 138 MMHG | DIASTOLIC BLOOD PRESSURE: 102 MMHG | TEMPERATURE: 98.1 F | HEIGHT: 73 IN | BODY MASS INDEX: 28.49 KG/M2 | RESPIRATION RATE: 19 BRPM | WEIGHT: 215 LBS | HEART RATE: 96 BPM | OXYGEN SATURATION: 97 %

## 2023-02-18 DIAGNOSIS — K42.9 UMBILICAL HERNIA WITHOUT OBSTRUCTION AND WITHOUT GANGRENE: ICD-10-CM

## 2023-02-18 DIAGNOSIS — R74.8 ELEVATED LIPASE: ICD-10-CM

## 2023-02-18 DIAGNOSIS — F17.200 SMOKER: ICD-10-CM

## 2023-02-18 DIAGNOSIS — K92.2 GASTROINTESTINAL HEMORRHAGE, UNSPECIFIED GASTROINTESTINAL HEMORRHAGE TYPE: Primary | ICD-10-CM
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
## 2023-02-18 DIAGNOSIS — K40.90 LEFT INGUINAL HERNIA: ICD-10-CM

## 2023-02-18 DIAGNOSIS — R79.89 ELEVATED LACTIC ACID LEVEL: ICD-10-CM

## 2023-02-18 DIAGNOSIS — R11.2 NAUSEA AND VOMITING, UNSPECIFIED VOMITING TYPE: ICD-10-CM

## 2023-02-18 DIAGNOSIS — F10.21 HISTORY OF ALCOHOL DEPENDENCE (HCC): ICD-10-CM

## 2023-02-18 LAB
ACETAMINOPHEN LEVEL: <5 MCG/ML (ref 10–30)
ALBUMIN SERPL-MCNC: 3.6 G/DL (ref 3.5–5.2)
ALP BLD-CCNC: 124 U/L (ref 40–129)
ALT SERPL-CCNC: 37 U/L (ref 0–40)
ANION GAP SERPL CALCULATED.3IONS-SCNC: 17 MMOL/L (ref 7–16)
APTT: 33 SEC (ref 24.5–35.1)
AST SERPL-CCNC: 52 U/L (ref 0–39)
BASOPHILS ABSOLUTE: 0.06 E9/L (ref 0–0.2)
BASOPHILS RELATIVE PERCENT: 1.2 % (ref 0–2)
BILIRUB SERPL-MCNC: 0.2 MG/DL (ref 0–1.2)
BUN BLDV-MCNC: 7 MG/DL (ref 6–20)
CALCIUM SERPL-MCNC: 8.3 MG/DL (ref 8.6–10.2)
CHLORIDE BLD-SCNC: 97 MMOL/L (ref 98–107)
CO2: 28 MMOL/L (ref 22–29)
CREAT SERPL-MCNC: 0.7 MG/DL (ref 0.7–1.2)
EOSINOPHILS ABSOLUTE: 0.04 E9/L (ref 0.05–0.5)
EOSINOPHILS RELATIVE PERCENT: 0.8 % (ref 0–6)
ETHANOL: 345 MG/DL (ref 0–0.08)
GFR SERPL CREATININE-BSD FRML MDRD: >60 ML/MIN/1.73
GLUCOSE BLD-MCNC: 129 MG/DL (ref 74–99)
HCT VFR BLD CALC: 37.4 % (ref 37–54)
HEMOGLOBIN: 13.5 G/DL (ref 12.5–16.5)
IMMATURE GRANULOCYTES #: 0.04 E9/L
IMMATURE GRANULOCYTES %: 0.8 % (ref 0–5)
INR BLD: 1.1
LACTIC ACID: 4.5 MMOL/L (ref 0.5–2.2)
LIPASE: 172 U/L (ref 13–60)
LYMPHOCYTES ABSOLUTE: 2.42 E9/L (ref 1.5–4)
LYMPHOCYTES RELATIVE PERCENT: 50.1 % (ref 20–42)
MAGNESIUM: 1.7 MG/DL (ref 1.6–2.6)
MCH RBC QN AUTO: 32.9 PG (ref 26–35)
MCHC RBC AUTO-ENTMCNC: 36.1 % (ref 32–34.5)
MCV RBC AUTO: 91.2 FL (ref 80–99.9)
MONOCYTES ABSOLUTE: 0.86 E9/L (ref 0.1–0.95)
MONOCYTES RELATIVE PERCENT: 17.8 % (ref 2–12)
NEUTROPHILS ABSOLUTE: 1.41 E9/L (ref 1.8–7.3)
NEUTROPHILS RELATIVE PERCENT: 29.3 % (ref 43–80)
PDW BLD-RTO: 14.9 FL (ref 11.5–15)
PLATELET # BLD: 331 E9/L (ref 130–450)
PMV BLD AUTO: 9.6 FL (ref 7–12)
POTASSIUM SERPL-SCNC: 3.6 MMOL/L (ref 3.5–5)
PROTHROMBIN TIME: 11.9 SEC (ref 9.3–12.4)
RBC # BLD: 4.1 E12/L (ref 3.8–5.8)
SALICYLATE, SERUM: <0.3 MG/DL (ref 0–30)
SODIUM BLD-SCNC: 142 MMOL/L (ref 132–146)
TOTAL PROTEIN: 6.6 G/DL (ref 6.4–8.3)
TRICYCLIC ANTIDEPRESSANTS SCREEN SERUM: NEGATIVE NG/ML
TROPONIN, HIGH SENSITIVITY: 7 NG/L (ref 0–11)
WBC # BLD: 4.8 E9/L (ref 4.5–11.5)

## 2023-02-18 PROCEDURE — 2580000003 HC RX 258: Performed by: NURSE PRACTITIONER

## 2023-02-18 PROCEDURE — 99285 EMERGENCY DEPT VISIT HI MDM: CPT

## 2023-02-18 PROCEDURE — 83605 ASSAY OF LACTIC ACID: CPT

## 2023-02-18 PROCEDURE — 96361 HYDRATE IV INFUSION ADD-ON: CPT

## 2023-02-18 PROCEDURE — 93005 ELECTROCARDIOGRAM TRACING: CPT | Performed by: NURSE PRACTITIONER

## 2023-02-18 PROCEDURE — 85610 PROTHROMBIN TIME: CPT

## 2023-02-18 PROCEDURE — 6360000002 HC RX W HCPCS: Performed by: NURSE PRACTITIONER

## 2023-02-18 PROCEDURE — 84484 ASSAY OF TROPONIN QUANT: CPT

## 2023-02-18 PROCEDURE — 85730 THROMBOPLASTIN TIME PARTIAL: CPT

## 2023-02-18 PROCEDURE — 71045 X-RAY EXAM CHEST 1 VIEW: CPT

## 2023-02-18 PROCEDURE — 82077 ASSAY SPEC XCP UR&BREATH IA: CPT

## 2023-02-18 PROCEDURE — C9113 INJ PANTOPRAZOLE SODIUM, VIA: HCPCS | Performed by: NURSE PRACTITIONER

## 2023-02-18 PROCEDURE — 80307 DRUG TEST PRSMV CHEM ANLYZR: CPT

## 2023-02-18 PROCEDURE — 96374 THER/PROPH/DIAG INJ IV PUSH: CPT

## 2023-02-18 PROCEDURE — 96375 TX/PRO/DX INJ NEW DRUG ADDON: CPT

## 2023-02-18 PROCEDURE — 74177 CT ABD & PELVIS W/CONTRAST: CPT

## 2023-02-18 PROCEDURE — 85025 COMPLETE CBC W/AUTO DIFF WBC: CPT

## 2023-02-18 PROCEDURE — 80053 COMPREHEN METABOLIC PANEL: CPT

## 2023-02-18 PROCEDURE — 6360000004 HC RX CONTRAST MEDICATION: Performed by: RADIOLOGY

## 2023-02-18 PROCEDURE — 80143 DRUG ASSAY ACETAMINOPHEN: CPT

## 2023-02-18 PROCEDURE — 80179 DRUG ASSAY SALICYLATE: CPT

## 2023-02-18 PROCEDURE — 36415 COLL VENOUS BLD VENIPUNCTURE: CPT

## 2023-02-18 PROCEDURE — 83690 ASSAY OF LIPASE: CPT

## 2023-02-18 PROCEDURE — 83735 ASSAY OF MAGNESIUM: CPT

## 2023-02-18 RX ORDER — ONDANSETRON 4 MG/1
4 TABLET, FILM COATED ORAL EVERY 8 HOURS PRN
Qty: 10 TABLET | Refills: 0 | Status: SHIPPED | OUTPATIENT
Start: 2023-02-18

## 2023-02-18 RX ORDER — 0.9 % SODIUM CHLORIDE 0.9 %
1000 INTRAVENOUS SOLUTION INTRAVENOUS ONCE
Status: COMPLETED | OUTPATIENT
Start: 2023-02-18 | End: 2023-02-18

## 2023-02-18 RX ORDER — 0.9 % SODIUM CHLORIDE 0.9 %
30 INTRAVENOUS SOLUTION INTRAVENOUS ONCE
Status: DISCONTINUED | OUTPATIENT
Start: 2023-02-18 | End: 2023-02-18 | Stop reason: HOSPADM

## 2023-02-18 RX ORDER — PANTOPRAZOLE SODIUM 40 MG/10ML
80 INJECTION, POWDER, LYOPHILIZED, FOR SOLUTION INTRAVENOUS ONCE
Status: COMPLETED | OUTPATIENT
Start: 2023-02-18 | End: 2023-02-18

## 2023-02-18 RX ORDER — ONDANSETRON 2 MG/ML
4 INJECTION INTRAMUSCULAR; INTRAVENOUS ONCE
Status: COMPLETED | OUTPATIENT
Start: 2023-02-18 | End: 2023-02-18

## 2023-02-18 RX ORDER — PANTOPRAZOLE SODIUM 20 MG/1
20 TABLET, DELAYED RELEASE ORAL 2 TIMES DAILY
Qty: 28 TABLET | Refills: 0 | Status: SHIPPED | OUTPATIENT
Start: 2023-02-18 | End: 2023-03-04

## 2023-02-18 RX ADMIN — SODIUM CHLORIDE 1000 ML: 9 INJECTION, SOLUTION INTRAVENOUS at 14:35

## 2023-02-18 RX ADMIN — PANTOPRAZOLE SODIUM 80 MG: 40 INJECTION, POWDER, FOR SOLUTION INTRAVENOUS at 14:37

## 2023-02-18 RX ADMIN — IOPAMIDOL 75 ML: 755 INJECTION, SOLUTION INTRAVENOUS at 15:40

## 2023-02-18 RX ADMIN — ONDANSETRON 4 MG: 2 INJECTION INTRAMUSCULAR; INTRAVENOUS at 14:37

## 2023-02-18 ASSESSMENT — ENCOUNTER SYMPTOMS
VOMITING: 1
NAUSEA: 1
RESPIRATORY NEGATIVE: 1
SHORTNESS OF BREATH: 0
EYES NEGATIVE: 1
COUGH: 0
ABDOMINAL DISTENTION: 1
ABDOMINAL PAIN: 1
ALLERGIC/IMMUNOLOGIC NEGATIVE: 1
BLOOD IN STOOL: 1

## 2023-02-18 ASSESSMENT — LIFESTYLE VARIABLES
HOW OFTEN DO YOU HAVE A DRINK CONTAINING ALCOHOL: 4 OR MORE TIMES A WEEK
HOW MANY STANDARD DRINKS CONTAINING ALCOHOL DO YOU HAVE ON A TYPICAL DAY: 5 OR 6

## 2023-02-18 NOTE — Clinical Note
Amelia Goodson was seen and treated in our emergency department on 2/18/2023. He may return to work on 02/19/2023. If you have any questions or concerns, please don't hesitate to call.       Lakisha Brantley, DO

## 2023-02-18 NOTE — Clinical Note
The patient has decided to leave against medical advice, because declines further eval and treatment. He has normal mental status and adequate capacity to make medical decisions. The patient refuses hospital admission and wants to be discharged . The risks have been explained to the patient, including worsening illness, chronic pain, permanent disability, and death. The benefits of admission have also been explained, including the availability and proximity of nurses, physicians, manjeet toring, diagnostic testing, and treatment. The patient was able to understand and state the risks and benefits of hospital admission. This was witnessed by nurse and me. He had the opportunity to ask questions about his medical condition. T he patient was treated to the extent that he would allow, and knows that he may return for care at any time. Follow up has been discussed .

## 2023-02-18 NOTE — ED PROVIDER NOTES
Shared [facility]-ED Attending Visit. CC: No            [facility]  ED  Encounter Note  Admit Date/RoomTime: 2023  1:31 PM  ED Room:   NAME: Felicita Roca  : 1970  MRN: [id number]  PCP: No primary care provider on file. CHIEF COMPLAINT     Rectal Bleeding (C/O rectal bleeding for the last month. ) and Nausea (States he has only eaten 2 cheeseburgers, some beef jerky, and a bowl of clam chowder in the last 6 weeks because of nausea. Has 6 four locos daily. )    HISTORY OF PRESENT ILLNESS        Felicita Roca is a 47 y.o. male who presents to the ED by ambulance from home for complaints of intermittent bloody stools, nausea, vomiting and a bulge in his mid abdomen for the past month to 6 weeks. He reports every time he attempts to eat it comes right back up due to nausea. He reports he took an ambulance today because he was drunk and had 3-24 ounce 14 % loco's today and usually drinks a 6 pack per day and smokes a pack of cigarettes per day since the age of 13. Patient reports he stopped drinking hard liquor 2 years ago when he had 2 DUIs. He denies any drug use. He denies any chest pain, back pain, dizziness, shortness of breath, fever, chills, leg pain, leg swelling, hematemesis, coffee ground emesis, weight loss or rectal pain. He denies any family history because he is adopted and lives alone with his 2 cat. The complaint has been persistent and gradually worsening and are moderate in severity. He states he had an episode several years ago where he had vomited blood but has never seen a gastroenterologist or been scoped he does not have a PCP and does not take any aspirin, NSAIDs or any other medications. He reports he recently was told he would lose his job since he has been calling off work and should come to the ED to get checked out since he has been calling off work.      REVIEW OF SYSTEMS     Pertinent positives and negatives are stated within HPI, all other systems reviewed and are negative. Review of Systems   Constitutional:  Positive for appetite change. Negative for chills, diaphoresis, fever and unexpected weight change. HENT: Negative. Eyes: Negative. Respiratory: Negative. Negative for cough and shortness of breath. Cardiovascular: Negative. Negative for chest pain, palpitations and leg swelling. Gastrointestinal:  Positive for abdominal distention, abdominal pain, blood in stool, nausea and vomiting. Endocrine: Negative. Musculoskeletal: Negative. Skin: Negative. Negative for pallor and rash. Allergic/Immunologic: Negative. Negative for immunocompromised state. Neurological:  Negative for dizziness, weakness, light-headedness and headaches. Hematological: Negative. Psychiatric/Behavioral: Negative. Negative for confusion and hallucinations. Past Medical History:  has a past medical history of Hernia. Surgical History:  has a past surgical history that includes Mandible fracture surgery. Social History:  reports that he has been smoking cigarettes. He has a 27.00 pack-year smoking history. He uses smokeless tobacco. He reports current alcohol use. He reports that he does not currently use drugs after having used the following drugs: Marijuana Jurosy Holt). Family History: family history is not on file. Allergies: Patient has no known allergies.   CURRENT MEDICATIONS       Discharge Medication List as of 2/18/2023  4:54 PM        CONTINUE these medications which have NOT CHANGED    Details   naproxen (NAPROSYN) 500 MG tablet Take 1 tablet by mouth 2 times daily as needed for Pain, Disp-28 tablet, R-0Normal             SCREENINGS     Salol Coma Scale  Eye Opening: Spontaneous  Best Verbal Response: Oriented  Best Motor Response: Obeys commands  Jayden Coma Scale Score: 15         CIWA Assessment  BP: (!) 138/102  Heart Rate: 96  Nausea and Vomiting: no nausea and no vomiting  Tactile Disturbances: none  Tremor: no tremor  Auditory Disturbances: not present  Paroxysmal Sweats: no sweat visible  Visual Disturbances: not present  Anxiety: no anxiety, at ease  Headache, Fullness in Head: none present  Agitation: normal activity  Orientation and Clouding of Sensorium: oriented and can do serial additions  CIWA-Ar Total: 0       PHYSICAL EXAM   Oxygen Saturation Interpretation: Normal on room air analysis. ED Triage Vitals [02/18/23 1333]   BP Temp Temp Source Heart Rate Resp SpO2 Height Weight   (!) 142/105 98.1 °F (36.7 °C) Oral (!) 108 16 98 % 6' 1\" (1.854 m) 215 lb (97.5 kg)         Physical Exam  Vitals and nursing note reviewed. Exam conducted with a chaperone present. Constitutional:       General: He is not in acute distress. Appearance: Normal appearance. He is normal weight. He is not ill-appearing, toxic-appearing or diaphoretic. HENT:      Head: Normocephalic and atraumatic. Nose: Nose normal.      Mouth/Throat:      Mouth: Mucous membranes are moist.   Eyes:      Extraocular Movements: Extraocular movements intact. Conjunctiva/sclera: Conjunctivae normal.      Pupils: Pupils are equal, round, and reactive to light. Neck:      Vascular: No carotid bruit. Cardiovascular:      Rate and Rhythm: Normal rate and regular rhythm. Pulses: Normal pulses. Heart sounds: Normal heart sounds. No murmur heard. No friction rub. No gallop. Pulmonary:      Effort: Pulmonary effort is normal. No respiratory distress. Breath sounds: Normal breath sounds. No wheezing, rhonchi or rales. Chest:      Chest wall: No tenderness. Abdominal:      General: Bowel sounds are normal.      Tenderness: There is no abdominal tenderness. There is no right CVA tenderness, left CVA tenderness, guarding or rebound. Hernia: A hernia is present. Genitourinary:     Prostate: Not tender. Rectum: Guaiac result positive.  No mass, tenderness, anal fissure, external hemorrhoid or internal hemorrhoid. Normal anal tone. Comments: RN present and no stool noted in rectal vault Hemoccult positive. Musculoskeletal:         General: Normal range of motion. Right lower leg: No edema. Left lower leg: No edema. Skin:     General: Skin is warm. Capillary Refill: Capillary refill takes less than 2 seconds. Findings: No rash. Neurological:      General: No focal deficit present. Mental Status: He is alert and oriented to person, place, and time. GCS: GCS eye subscore is 4. GCS verbal subscore is 5. GCS motor subscore is 6. Cranial Nerves: Cranial nerves 2-12 are intact. Sensory: Sensation is intact. Coordination: Coordination is intact. Gait: Gait is intact. Deep Tendon Reflexes: Reflexes are normal and symmetric. Psychiatric:         Mood and Affect: Mood normal.         Behavior: Behavior normal.         Thought Content:  Thought content normal.         Judgment: Judgment normal.     (All laboratory and radiology results have been personally reviewed by myself)  Labs:  Results for orders placed or performed during the hospital encounter of 02/18/23   APTT   Result Value Ref Range    aPTT 33.0 24.5 - 35.1 sec   Protime-INR   Result Value Ref Range    Protime 11.9 9.3 - 12.4 sec    INR 1.1    CBC with Auto Differential   Result Value Ref Range    WBC 4.8 4.5 - 11.5 E9/L    RBC 4.10 3.80 - 5.80 E12/L    Hemoglobin 13.5 12.5 - 16.5 g/dL    Hematocrit 37.4 37.0 - 54.0 %    MCV 91.2 80.0 - 99.9 fL    MCH 32.9 26.0 - 35.0 pg    MCHC 36.1 (H) 32.0 - 34.5 %    RDW 14.9 11.5 - 15.0 fL    Platelets 463 011 - 347 E9/L    MPV 9.6 7.0 - 12.0 fL    Neutrophils % 29.3 (L) 43.0 - 80.0 %    Immature Granulocytes % 0.8 0.0 - 5.0 %    Lymphocytes % 50.1 (H) 20.0 - 42.0 %    Monocytes % 17.8 (H) 2.0 - 12.0 %    Eosinophils % 0.8 0.0 - 6.0 %    Basophils % 1.2 0.0 - 2.0 %    Neutrophils Absolute 1.41 (L) 1.80 - 7.30 E9/L    Immature Granulocytes # 0.04 E9/L    Lymphocytes Absolute 2.42 1.50 - 4.00 E9/L    Monocytes Absolute 0.86 0.10 - 0.95 E9/L    Eosinophils Absolute 0.04 (L) 0.05 - 0.50 E9/L    Basophils Absolute 0.06 0.00 - 0.20 E9/L   Comprehensive Metabolic Panel   Result Value Ref Range    Sodium 142 132 - 146 mmol/L    Potassium 3.6 3.5 - 5.0 mmol/L    Chloride 97 (L) 98 - 107 mmol/L    CO2 28 22 - 29 mmol/L    Anion Gap 17 (H) 7 - 16 mmol/L    Glucose 129 (H) 74 - 99 mg/dL    BUN 7 6 - 20 mg/dL    Creatinine 0.7 0.7 - 1.2 mg/dL    Est, Glom Filt Rate >60 >=60 mL/min/1.73    Calcium 8.3 (L) 8.6 - 10.2 mg/dL    Total Protein 6.6 6.4 - 8.3 g/dL    Albumin 3.6 3.5 - 5.2 g/dL    Total Bilirubin 0.2 0.0 - 1.2 mg/dL    Alkaline Phosphatase 124 40 - 129 U/L    ALT 37 0 - 40 U/L    AST 52 (H) 0 - 39 U/L   Lactic Acid   Result Value Ref Range    Lactic Acid 4.5 (HH) 0.5 - 2.2 mmol/L   Lipase   Result Value Ref Range    Lipase 172 (H) 13 - 60 U/L   Serum Drug Screen   Result Value Ref Range    Ethanol Lvl 345 mg/dL    Acetaminophen Level <5.0 (L) 10.0 - 70.9 mcg/mL    Salicylate, Serum <8.7 0.0 - 30.0 mg/dL    TCA Scrn NEGATIVE Cutoff:300 ng/mL   Troponin   Result Value Ref Range    Troponin, High Sensitivity 7 0 - 11 ng/L   Magnesium   Result Value Ref Range    Magnesium 1.7 1.6 - 2.6 mg/dL   EKG 12 Lead   Result Value Ref Range    Ventricular Rate 87 BPM    Atrial Rate 87 BPM    P-R Interval 146 ms    QRS Duration 90 ms    Q-T Interval 390 ms    QTc Calculation (Bazett) 469 ms    P Axis 39 degrees    R Axis 70 degrees    T Axis 65 degrees     Imaging: All Radiology results interpreted by Radiologist unless otherwise noted. CT ABDOMEN PELVIS W IV CONTRAST Additional Contrast? None   Final Result   1. No acute process identified in the abdomen or pelvis. 2 stable fat containing left inguinal hernia. Tiny umbilical hernia. XR CHEST PORTABLE   Final Result   No acute process.                ED COURSE   Vitals:    Vitals:    02/18/23 1422 02/18/23 1447 02/18/23 1507 02/18/23 1633   BP:  (!) 132/97 (!) 145/94 (!) 138/102   Pulse:  86 92 96   Resp:  20 21 19   Temp:       TempSrc:       SpO2: 96% 94% 97% 97%   Weight:       Height:           Patient was given the following medications:  Medications   0.9 % sodium chloride bolus (0 mLs IntraVENous Stopped 2/18/23 1606)   pantoprazole (PROTONIX) injection 80 mg (80 mg IntraVENous Given 2/18/23 1437)   ondansetron (ZOFRAN) injection 4 mg (4 mg IntraVENous Given 2/18/23 1437)   iopamidol (ISOVUE-370) 76 % injection 75 mL (75 mLs IntraVENous Given 2/18/23 1540)       ED Course as of 02/19/23 0156   Sat Feb 18, 2023   1424 EKG @ 1402: This EKG is signed and interpreted by Dr Ilana Rogel. Rate: 87  Rhythm: Sinus  Interpretation: Sinus rhythm, normal axis, DE is 146, QRS is 9 0, QTc is 469, no evidence of ST elevation nor preexcitation, nonspecific and stable compared to 9/19/2019  Comparison: stable as compared to patient's most recent EKG   [ME]      ED Course User Index  [ME] Callie Foster,      PROCEDURES   None    REASSESSMENT   2/18/23       Time: 6598 patient getting dressed and his brother is at bedside and discussed results of labs and he states \"I want this IV out and want to get the hell out of here\". Patients condition is improving after treatment. CONSULTS:   None    History from : Patient    Limitations to history : None    Discussion with Other Professionals : None    Social Determinants Significantly Affecting Health : NO pcp and daily alcohol use. Records Reviewed : None    Disposition Discussion: I did discuss being admitted to the patient and I offered him something for his anxiety and he refused he felt that his symptoms were improving and only wanted an excuse so he can go back and tell his employer that he went to the ED and see if they can give him his job back.   Patient refusing any further IV fluids or treatment in the ED and wanted to sign out AGAINST MEDICAL ADVICE. His brother was here and will agree with patient's wishes and will assume responsibility for his care and sign for the patient and take him home. MDM:  Skylar Merino is a 47 y.o. male who presents to the ED by ambulance from home for complaints of intermittent bloody stools, nausea, vomiting and a bulge in his mid abdomen for the past month to 6 weeks. He reports every time he attempts to eat it comes right back up due to nausea. He reports he took an ambulance today because he was drunk and had 3-24 ounce 14 % loco's today and usually drinks a 6 pack per day and smokes a pack of cigarettes per day since the age of 13. He denies any drug use. He denies any chest pain, back pain, dizziness, shortness of breath, fever, chills, leg pain, leg swelling, hematemesis, coffee ground emesis, weight loss or rectal pain. He denies any family history because he is adopted and lives alone with his 2 cat. The complaint has been persistent and gradually worsening and are moderate in severity. He states he had an episode several years ago where he had vomited blood but has never seen a gastroenterologist or been scoped he does not have a PCP and does not take any aspirin, NSAIDs or any other medications. He reports he recently was told he would lose his job since he has been calling off work and should come to the ED to get checked out since he has been calling off work. Patient had a Hemoccult stool that was positive and differential diagnosis to include but not limited to gastric ulcer with bleeding, nausea vomiting rule out ventral hernia versus incarcerated hernia. On examination patient's hernia is easily reduced. Will obtain labs and give IV fluids. Additionally, will give Protonix, IV fluids and Zofran. Lab work reviewed WBCs 4.8; Hgb 13.5; K3.6; anion gap 17; ALT 37; AST 52; lipase elevated 172; lactic acid 4.5 we will give an additional liter of fluids and recheck lactic acid.   Troponin 7; MG 1. 7.  CT of the abdomen reveals no acute findings and a stable fat-containing left inguinal and a tiny umbilical hernia. Chest x-ray normal.  Serum drug screen reveals ethanol level 345. Patient had improvement of symptoms. He did not want any further IV fluids. He called his brother for a ride home and his brother is here and will assume the care of the patient and take him home. Patient was made aware of signs and symptoms warranting immediate return and he was also advised to follow-up with surgeon on-call for reevaluation most likely will need a upper endoscopy lower endoscopy and will give a prescription for Protonix and Zofran for home. Patient also does not have a PCP he will given [facility] access to establish a PCP. Patient also given referral center for alcohol dependency and follow-up. Patient and brother were advised on signs and symptoms warranting immediate return at any time and can return at any time for reevaluation. Patient and brother agreed on signing [facility] papers at this time and will follow up will return if worsening of symptoms. Patient is alert answering questions appropriately has a steady gait and in no acute distress on discharge. This patient has chosen to leave against medical advice. DANNA Abraham CNP   have personally explained to them that choosing to do so may result in permanent bodily harm or death. Discussed at length that without further evaluation and monitoring there may be unforeseen circumstances and deterioration causing permanent bodily harm or death as a result of their choice. They are alert, oriented, and have the capacity and are competent at this time to make this decision. They state that they are aware of the serious risks as explained, but they continue to wish to leave against medical advice.     In light of their decision to leave against medical advice, follow-up has been arranged and they are aware of the importance of following up as instructed. They have been advised that they should return to the ED immediately if they change their mind at any time, or if their condition begins to change or worsen. Plan of Care/Counseling:  DANNA Fuentes CNP and EM Attending Physician reviewed today's visit with the patient in addition to providing specific details for the plan of care and counseling regarding the diagnosis and prognosis. Questions are answered at this time and are agreeable with the plan. ASSESSMENT     1. Gastrointestinal hemorrhage, unspecified gastrointestinal hemorrhage type    2. Elevated lipase    3. Nausea and vomiting, unspecified vomiting type    4. History of alcohol dependence (HCC)    5. Smoker    6. Blood alcohol level of 240 mg/100 ml or more    7. Elevated lactic acid level    8. Umbilical hernia without obstruction and without gangrene    9. Left inguinal hernia      This patient's ED course included: a personal history and physicial examination, re-evaluation prior to disposition, multiple bedside re-evaluations, IV medications, and complex medical decision making and emergency management  This patient has remained hemodynamically stable, improved, and been closely monitored during their ED course. DISPOSITION   Patient signed-out Against Medical Advice (AMA). Patient condition is stable    NEW MEDICATIONS     Discharge Medication List as of 2/18/2023  4:54 PM        START taking these medications    Details   ondansetron (ZOFRAN) 4 MG tablet Take 1 tablet by mouth every 8 hours as needed for Nausea, Disp-10 tablet, R-0Normal      pantoprazole (PROTONIX) 20 MG tablet Take 1 tablet by mouth 2 times daily for 14 days, Disp-28 tablet, R-0Normal           Electronically signed by DANNA Fuentes CNP   DD: 2/18/23  **This report was transcribed using voice recognition software. Every effort was made to ensure accuracy; however, inadvertent computerized transcription errors may be present.   END OF ED PROVIDER NOTE           DANNA Hedrick - CNP  02/19/23 0213        ATTENDING PROVIDER ATTESTATION:     Priyank Araujo presented to the emergency department for evaluation of Rectal Bleeding (C/O rectal bleeding for the last month. ) and Nausea (States he has only eaten 2 cheeseburgers, some beef jerky, and a bowl of clam chowder in the last 6 weeks because of nausea. Has 6 four locos daily. )    I have reviewed and discussed the case, including pertinent history (medical, surgical, family and social) and exam findings with the Midlevel and the Nurse assigned to Priyank Araujo. I personally saw the patient and performed a substantive portion of the visit including history, exam, and all aspects of the medical decision making. The nursing notes within the ED encounter have been reviewed by myself     I have personally reviewed all laboratory radiology results from today's visit. I have personally reviewed and agree with [facility] interpretation of EKG for all EKGs from today's visit. The patients available past medical records and past encounters were reviewed. MDM: Asked to see this patient by [facility], required direct physician involvement secondary to complexity of case,   Medical Decision Making      EKG is ordered to have documentation of patient's current rhythm, and to rule out any obvious acute cardiac illnesses such as ACS. Additionally, QT interval may be of use in decision making regarding any medications administered here in the ED. Patient is placed on cardiac monitor and continuous pulse ox for monitoring. POCT glucose ordered to rule out acute hyperglycemia or hypoglycemia as a cause of symptoms. CBC is ordered to evaluate for any signs of infection or inflammation by obtaining a WBC count, or any signs of acute anemia by interpreting hemoglobin. CMP was ordered to evaluate for any electrolyte imbalances, kidney function, or any elevations in anion gap.  Troponin ordered to evaluate for possible cardiac etiology of symptoms including but not limited to STEMI or NSTEMI. Lipase levels were ordered to evaluate for possible elevations which suggest pancreatic etiology of symptoms. Lactic acid levels were ordered to evaluate for signs of ischemia or decrease perfusion to organ systems. Hepatic function panel obtained to assess liver function to dose medications, and to exclude acute elevation in transaminases which may indicate hepatitis or other hepatic pathology. Chest x-ray is ordered to evaluate for any possible signs of pneumonia, pleural effusions, cardiomegaly, pneumothorax, atelectasis, rib or sternal abnormalities including fractures. A CT abdomen with IV contrast was ordered to evaluate for, but without limitation, constipation, small bowel obstruction, bowel ischemia, pneumoperitoneum, diverticulitis, cholecystitis, appendicitis, perforation. Amount and/or Complexity of Data Reviewed  Labs: ordered. Decision-making details documented in ED Course. Radiology: ordered and independent interpretation performed. Decision-making details documented in ED Course. ECG/medicine tests: ordered and independent interpretation performed. Decision-making details documented in ED Course. Risk  Prescription drug management. I, Dr. Sandy Locke am the primary provider of record      I have reviewed my findings and recommendations with Felicita Roca and members of family present at the time of disposition.       --------------------------------- IMPRESSION AND DISPOSITION ---------------------------------    IMPRESSION  1. Gastrointestinal hemorrhage, unspecified gastrointestinal hemorrhage type    2. Elevated lipase    3. Nausea and vomiting, unspecified vomiting type    4. History of alcohol dependence (HCC)    5. Smoker    6. Blood alcohol level of 240 mg/100 ml or more    7. Elevated lactic acid level    8. Umbilical hernia without obstruction and without gangrene    9.  Left inguinal hernia        DISPOSITION  Disposition: Other Disposition: Left AMA  Patient condition is stable       Pérez Latif DO  02/19/23 [id number]

## 2023-02-18 NOTE — Clinical Note
Felicita Chanelle was seen and treated in our emergency department on 2/18/2023. He may return to work on 02/19/2023. If you have any questions or concerns, please don't hesitate to call.       Mark Douglas, DO

## 2023-02-18 NOTE — DISCHARGE INSTRUCTIONS
Leaving Against Medical Advice    Discharge against medical advice means that you choose to leave the hospital before your caregiver recommends that you do. Your caregiver may still need to diagnose or treat your condition or your treatment may not be complete. INSTRUCTIONS: contact your doctor or the provider assigned on your instructions as soon as possible to arrange follow-up. Risks:  Risks of leaving the hospital before your caregivers recommend include the following: Your condition may cause other health problems if not treated properly including disability or death. .        You may need to be admitted to the hospital again for the same condition. Your condition could become life-threatening. [facility]    For Residents of [address] [phone]    For a complete list of treatment centers in your area please visit:   [address] website at   [email or web address]    * Alcoholics Anonymous [phone]                *Narcotics Anonymous [phone]    [facility] [phone]  [place] [address], [facility], [address] [facility]  www.[email or web address]  *Residents of:  [facility] Only for [employer]. ALL other [employer] on [facility] Accepted. *Payments Accepted: Medicaid or Self-pay ONLY for [employer]. Private Insurance accepted for Outpatient Programs   *Services Offered: Outpatient Behavioral Health & Chemical Dependency. [facility] [phone]  [facility]. [place]   www.[email or web address]   *Residents of:  [employer] Only   *Payments Accepted: Medicaid or Vene 89 Offered: Outpatient Behavioral Health & Chemical Dependency.       [facility] SPECIALIZED SURGERY [phone]  or [phone] [address]oon Hart, [place] [address] [facility] [address] flores. com/   *Residents of:  All [facility]   *Payments Accepted: Self-Pay and [employer]   *Services Offered: Detox & Outpatient Substance Abuse Programs. [address] [place] [address] [phone]) ext. [phone], [place], [address]  [email or web address]  (MUST CALL DAILY FOR BED AVAILABILITY)   *Residents of:  [address] [facility] 54 Residents   *Payments Accepted: Medicaid or Self-Pay   *Services Offered: Detox & Outpatient Substance Abuse Programs. [facility] [phone] 61 [facility]  www.MediSafe Project/  *Residents of:  [address] [facility] 54 Residents   *Payments Accepted:  Self-Pay, [employer], Some Medicaid for age <20 Years. *Services Offered: [facility] [phone] or [phone]  www. [facility].[email or web address]/     *Residents of:  All [facility]   *Payments Accepted: [employer], [facility] or Self-Pay. *Services Offered: Following Detox, Outpatient Substance Abuse and [place]  [address] [place] [address],[address]    [facility], [address] [place] [address]   [place], [address]    [facility] [address]. [place], [address]   [place], [address]      [address] [place] [address] Rd [phone] [address] [facility] [address], [facility], [address] [place] [address]  www. [email or web address]/  *Residents of:  All [facility]   *Payments Accepted: [facility]. *Services Offered: Intensive Outpatient Substance Abuse and Tony [phone]  [facility], [address] [facility]  www. [email or web address]/  *Residents of:  [facility].    *Payments Accepted: Private Insurance, [place] and Self-Pay/Sliding Scale. *Services Offered: Substance Abuse Outpatient Programs. [address] [facility] [address] [phone]  [address] [facility], [address]  www.[email or web address]/  *Residents of:  [facility]. *Payments Accepted: Private Insurance, [facility] and Self-Pay. *Services Offered: Substance Abuse Outpatient Programs. Annette Sanchez [phone]  [address] [place], [place] [address], [address]  [email or web address]/  *Residents of:  [facility]. *Payments Accepted: Private Insurance, [place] and Self-pay. *Services Offered: Substance Abuse Outpatient Programs. [employer] [phone]  www. [email or web address]/  [facility] Office:    Adult Office:    Children's Office:  [phone] [address]. [place], [address]    [place], [address]               [place], [address] [facility]  *Residents of:  [facility] ONLY for Self-Pay. All Counties accepted with [employer], Medicare & Medicaid   *Payments Accepted: Medicare, Medicaid, Private Insurance and Self-Pay  *Services Offered: [address] Pe[address]. for Counseling. [facility] / [facility] [phone] [address] [facility], [address]   www. [facility]. org/  *Payments Accepted: Sliding Scale for Fees. *Services Offered: Call for Available Services. [employer] [phone] [facility], [place], [address]  www. Mailcloud. [employer]/  (MUST CALL DAILY FOR BED AVAILABILITY)  *Residents of:  [facility]. *Payments Accepted: Medicaid and Self-Pay. *Services Offered: Detox & Substance Abuse Outpatient Programs.

## 2023-02-18 NOTE — ED NOTES
Patient stated they have had bright red stools, denies coffee ground emesis, patient stated they consumed 3 24oz, cans of alcohol today, patient alert and oriented x4 able to make needs known, patient stable on feet during assessment, connected to monitor, able to make needs known, call light within reach      [facility], RN  02/18/23 4769

## 2023-02-18 NOTE — ED NOTES
Patient if refusing treatment and verbally expressed desire to leave facility      [facility] [place]  02/18/23 5057

## 2023-02-18 NOTE — ED NOTES
Confirmed with physician fluid order of 2900ml, they stated to subtract what the patient already received and the new total is 1900ml to be administered Via IV fluids      Abelardo Fontenot RN  02/18/23 1822

## 2023-02-19 LAB
EKG ATRIAL RATE: 87 BPM
EKG P AXIS: 39 DEGREES
EKG P-R INTERVAL: 146 MS
EKG Q-T INTERVAL: 390 MS
EKG QRS DURATION: 90 MS
EKG QTC CALCULATION (BAZETT): 469 MS
EKG R AXIS: 70 DEGREES
EKG T AXIS: 65 DEGREES
EKG VENTRICULAR RATE: 87 BPM

## 2023-02-20 PROCEDURE — 93010 ELECTROCARDIOGRAM REPORT: CPT | Performed by: INTERNAL MEDICINE

## 2023-03-12 PROCEDURE — 96374 THER/PROPH/DIAG INJ IV PUSH: CPT

## 2023-03-12 PROCEDURE — 96375 TX/PRO/DX INJ NEW DRUG ADDON: CPT

## 2023-03-12 PROCEDURE — 99285 EMERGENCY DEPT VISIT HI MDM: CPT

## 2023-03-13 ENCOUNTER — HOSPITAL ENCOUNTER (INPATIENT)
Age: 53
LOS: 3 days | Discharge: HOME | DRG: 392 | End: 2023-03-16
Attending: EMERGENCY MEDICINE | Admitting: FAMILY MEDICINE
Payer: MEDICAID

## 2023-03-13 ENCOUNTER — APPOINTMENT (OUTPATIENT)
Dept: GENERAL RADIOLOGY | Age: 53
DRG: 392 | End: 2023-03-13
Payer: MEDICAID

## 2023-03-13 ENCOUNTER — APPOINTMENT (OUTPATIENT)
Dept: CT IMAGING | Age: 53
DRG: 392 | End: 2023-03-13
Payer: MEDICAID

## 2023-03-13 DIAGNOSIS — E87.20 LACTIC ACIDOSIS: ICD-10-CM

## 2023-03-13 DIAGNOSIS — K52.9 COLITIS: ICD-10-CM

## 2023-03-13 DIAGNOSIS — R10.10 PAIN OF UPPER ABDOMEN: Primary | ICD-10-CM

## 2023-03-13 DIAGNOSIS — F10.939 ALCOHOL WITHDRAWAL SYNDROME WITH COMPLICATION (HCC): ICD-10-CM

## 2023-03-13 DIAGNOSIS — K92.2 GASTROINTESTINAL HEMORRHAGE, UNSPECIFIED GASTROINTESTINAL HEMORRHAGE TYPE: ICD-10-CM

## 2023-03-13 PROBLEM — R10.84 GENERALIZED ABDOMINAL PAIN: Status: ACTIVE | Noted: 2023-03-13

## 2023-03-13 PROBLEM — K42.9 UMBILICAL HERNIA WITHOUT OBSTRUCTION AND WITHOUT GANGRENE: Status: ACTIVE | Noted: 2023-03-13

## 2023-03-13 PROBLEM — K62.89 RECTAL MASS: Status: ACTIVE | Noted: 2023-03-13

## 2023-03-13 PROBLEM — K43.9 EPIGASTRIC HERNIA: Status: ACTIVE | Noted: 2023-03-13

## 2023-03-13 LAB
ACETAMINOPHEN LEVEL: <5 MCG/ML (ref 10–30)
ALBUMIN SERPL-MCNC: 3.5 G/DL (ref 3.5–5.2)
ALP BLD-CCNC: 186 U/L (ref 40–129)
ALT SERPL-CCNC: 25 U/L (ref 0–40)
AMPHETAMINE SCREEN, URINE: NOT DETECTED
ANION GAP SERPL CALCULATED.3IONS-SCNC: 13 MMOL/L (ref 7–16)
ANION GAP SERPL CALCULATED.3IONS-SCNC: 28 MMOL/L (ref 7–16)
AST SERPL-CCNC: 51 U/L (ref 0–39)
BACTERIA: NORMAL /HPF
BARBITURATE SCREEN URINE: NOT DETECTED
BASOPHILS ABSOLUTE: 0.04 E9/L (ref 0–0.2)
BASOPHILS RELATIVE PERCENT: 0.2 % (ref 0–2)
BENZODIAZEPINE SCREEN, URINE: NOT DETECTED
BILIRUB SERPL-MCNC: 1.3 MG/DL (ref 0–1.2)
BILIRUBIN DIRECT: 0.3 MG/DL (ref 0–0.3)
BILIRUBIN URINE: NEGATIVE
BILIRUBIN, INDIRECT: 1 MG/DL (ref 0–1)
BLOOD, URINE: ABNORMAL
BUN BLDV-MCNC: 7 MG/DL (ref 6–20)
BUN BLDV-MCNC: 7 MG/DL (ref 6–20)
CALCIUM SERPL-MCNC: 7.7 MG/DL (ref 8.6–10.2)
CALCIUM SERPL-MCNC: 8.9 MG/DL (ref 8.6–10.2)
CANNABINOID SCREEN URINE: NOT DETECTED
CHLORIDE BLD-SCNC: 101 MMOL/L (ref 98–107)
CHLORIDE BLD-SCNC: 96 MMOL/L (ref 98–107)
CLARITY: CLEAR
CO2: 15 MMOL/L (ref 22–29)
CO2: 22 MMOL/L (ref 22–29)
COCAINE METABOLITE SCREEN URINE: NOT DETECTED
COLOR: ABNORMAL
CREAT SERPL-MCNC: 0.5 MG/DL (ref 0.7–1.2)
CREAT SERPL-MCNC: 0.6 MG/DL (ref 0.7–1.2)
EKG ATRIAL RATE: 125 BPM
EKG P AXIS: -29 DEGREES
EKG P-R INTERVAL: 130 MS
EKG Q-T INTERVAL: 354 MS
EKG QRS DURATION: 86 MS
EKG QTC CALCULATION (BAZETT): 510 MS
EKG R AXIS: 70 DEGREES
EKG T AXIS: 49 DEGREES
EKG VENTRICULAR RATE: 125 BPM
EOSINOPHILS ABSOLUTE: 0.01 E9/L (ref 0.05–0.5)
EOSINOPHILS RELATIVE PERCENT: 0.1 % (ref 0–6)
ETHANOL: <10 MG/DL (ref 0–0.08)
FENTANYL SCREEN, URINE: NOT DETECTED
GFR SERPL CREATININE-BSD FRML MDRD: >60 ML/MIN/1.73
GFR SERPL CREATININE-BSD FRML MDRD: >60 ML/MIN/1.73
GLUCOSE BLD-MCNC: 143 MG/DL (ref 74–99)
GLUCOSE BLD-MCNC: 183 MG/DL (ref 74–99)
GLUCOSE URINE: NEGATIVE MG/DL
HCT VFR BLD CALC: 38.2 % (ref 37–54)
HEMOGLOBIN: 13.9 G/DL (ref 12.5–16.5)
IMMATURE GRANULOCYTES #: 0.12 E9/L
IMMATURE GRANULOCYTES %: 0.7 % (ref 0–5)
KETONES, URINE: NEGATIVE MG/DL
LACTIC ACID: 3 MMOL/L (ref 0.5–2.2)
LACTIC ACID: 6 MMOL/L (ref 0.5–2.2)
LEUKOCYTE ESTERASE, URINE: NEGATIVE
LIPASE: 63 U/L (ref 13–60)
LYMPHOCYTES ABSOLUTE: 1.02 E9/L (ref 1.5–4)
LYMPHOCYTES RELATIVE PERCENT: 5.8 % (ref 20–42)
Lab: NORMAL
MCH RBC QN AUTO: 33.8 PG (ref 26–35)
MCHC RBC AUTO-ENTMCNC: 36.4 % (ref 32–34.5)
MCV RBC AUTO: 92.9 FL (ref 80–99.9)
METER GLUCOSE: 208 MG/DL (ref 74–99)
METHADONE SCREEN, URINE: NOT DETECTED
MONOCYTES ABSOLUTE: 0.4 E9/L (ref 0.1–0.95)
MONOCYTES RELATIVE PERCENT: 2.3 % (ref 2–12)
NEUTROPHILS ABSOLUTE: 16.1 E9/L (ref 1.8–7.3)
NEUTROPHILS RELATIVE PERCENT: 90.9 % (ref 43–80)
NITRITE, URINE: NEGATIVE
OPIATE SCREEN URINE: NOT DETECTED
OXYCODONE URINE: NOT DETECTED
PDW BLD-RTO: 15.2 FL (ref 11.5–15)
PH UA: 7 (ref 5–9)
PHENCYCLIDINE SCREEN URINE: NOT DETECTED
PLATELET # BLD: 270 E9/L (ref 130–450)
PMV BLD AUTO: 10.4 FL (ref 7–12)
POTASSIUM SERPL-SCNC: 3.2 MMOL/L (ref 3.5–5)
POTASSIUM SERPL-SCNC: 3.4 MMOL/L (ref 3.5–5)
PROTEIN UA: ABNORMAL MG/DL
RBC # BLD: 4.11 E12/L (ref 3.8–5.8)
RBC UA: NORMAL /HPF (ref 0–2)
SALICYLATE, SERUM: <0.3 MG/DL (ref 0–30)
SODIUM BLD-SCNC: 136 MMOL/L (ref 132–146)
SODIUM BLD-SCNC: 139 MMOL/L (ref 132–146)
SPECIFIC GRAVITY UA: <=1.005 (ref 1–1.03)
TOTAL PROTEIN: 6.7 G/DL (ref 6.4–8.3)
TRICYCLIC ANTIDEPRESSANTS SCREEN SERUM: NEGATIVE NG/ML
UROBILINOGEN, URINE: 1 E.U./DL
WBC # BLD: 17.7 E9/L (ref 4.5–11.5)
WBC UA: NORMAL /HPF (ref 0–5)

## 2023-03-13 PROCEDURE — 80179 DRUG ASSAY SALICYLATE: CPT

## 2023-03-13 PROCEDURE — 2060000000 HC ICU INTERMEDIATE R&B

## 2023-03-13 PROCEDURE — 2500000003 HC RX 250 WO HCPCS: Performed by: EMERGENCY MEDICINE

## 2023-03-13 PROCEDURE — 6360000002 HC RX W HCPCS: Performed by: INTERNAL MEDICINE

## 2023-03-13 PROCEDURE — 83690 ASSAY OF LIPASE: CPT

## 2023-03-13 PROCEDURE — A4216 STERILE WATER/SALINE, 10 ML: HCPCS | Performed by: INTERNAL MEDICINE

## 2023-03-13 PROCEDURE — 80307 DRUG TEST PRSMV CHEM ANLYZR: CPT

## 2023-03-13 PROCEDURE — 83605 ASSAY OF LACTIC ACID: CPT

## 2023-03-13 PROCEDURE — C9113 INJ PANTOPRAZOLE SODIUM, VIA: HCPCS | Performed by: EMERGENCY MEDICINE

## 2023-03-13 PROCEDURE — 82962 GLUCOSE BLOOD TEST: CPT

## 2023-03-13 PROCEDURE — 80143 DRUG ASSAY ACETAMINOPHEN: CPT

## 2023-03-13 PROCEDURE — 93010 ELECTROCARDIOGRAM REPORT: CPT | Performed by: INTERNAL MEDICINE

## 2023-03-13 PROCEDURE — 2580000003 HC RX 258: Performed by: EMERGENCY MEDICINE

## 2023-03-13 PROCEDURE — 85025 COMPLETE CBC W/AUTO DIFF WBC: CPT

## 2023-03-13 PROCEDURE — 80048 BASIC METABOLIC PNL TOTAL CA: CPT

## 2023-03-13 PROCEDURE — 2500000003 HC RX 250 WO HCPCS: Performed by: INTERNAL MEDICINE

## 2023-03-13 PROCEDURE — 81001 URINALYSIS AUTO W/SCOPE: CPT

## 2023-03-13 PROCEDURE — 99254 IP/OBS CNSLTJ NEW/EST MOD 60: CPT | Performed by: SURGERY

## 2023-03-13 PROCEDURE — 80076 HEPATIC FUNCTION PANEL: CPT

## 2023-03-13 PROCEDURE — 93005 ELECTROCARDIOGRAM TRACING: CPT | Performed by: EMERGENCY MEDICINE

## 2023-03-13 PROCEDURE — 82077 ASSAY SPEC XCP UR&BREATH IA: CPT

## 2023-03-13 PROCEDURE — 2580000003 HC RX 258: Performed by: INTERNAL MEDICINE

## 2023-03-13 PROCEDURE — 6360000002 HC RX W HCPCS: Performed by: EMERGENCY MEDICINE

## 2023-03-13 PROCEDURE — 6370000000 HC RX 637 (ALT 250 FOR IP): Performed by: EMERGENCY MEDICINE

## 2023-03-13 PROCEDURE — 6360000002 HC RX W HCPCS: Performed by: FAMILY MEDICINE

## 2023-03-13 PROCEDURE — 74177 CT ABD & PELVIS W/CONTRAST: CPT

## 2023-03-13 PROCEDURE — 71045 X-RAY EXAM CHEST 1 VIEW: CPT

## 2023-03-13 PROCEDURE — C9113 INJ PANTOPRAZOLE SODIUM, VIA: HCPCS | Performed by: INTERNAL MEDICINE

## 2023-03-13 PROCEDURE — 6360000004 HC RX CONTRAST MEDICATION: Performed by: RADIOLOGY

## 2023-03-13 RX ORDER — METRONIDAZOLE 500 MG/100ML
500 INJECTION, SOLUTION INTRAVENOUS ONCE
Status: COMPLETED | OUTPATIENT
Start: 2023-03-13 | End: 2023-03-13

## 2023-03-13 RX ORDER — SODIUM CHLORIDE 0.9 % (FLUSH) 0.9 %
5-40 SYRINGE (ML) INJECTION EVERY 12 HOURS SCHEDULED
Status: DISCONTINUED | OUTPATIENT
Start: 2023-03-13 | End: 2023-03-16 | Stop reason: HOSPADM

## 2023-03-13 RX ORDER — HYDRALAZINE HYDROCHLORIDE 20 MG/ML
10 INJECTION INTRAMUSCULAR; INTRAVENOUS
Status: DISCONTINUED | OUTPATIENT
Start: 2023-03-13 | End: 2023-03-16 | Stop reason: HOSPADM

## 2023-03-13 RX ORDER — LORAZEPAM 2 MG/ML
2 INJECTION INTRAMUSCULAR
Status: DISCONTINUED | OUTPATIENT
Start: 2023-03-13 | End: 2023-03-16 | Stop reason: HOSPADM

## 2023-03-13 RX ORDER — SODIUM CHLORIDE 0.9 % (FLUSH) 0.9 %
5-40 SYRINGE (ML) INJECTION PRN
Status: DISCONTINUED | OUTPATIENT
Start: 2023-03-13 | End: 2023-03-16 | Stop reason: SDUPTHER

## 2023-03-13 RX ORDER — ONDANSETRON 2 MG/ML
4 INJECTION INTRAMUSCULAR; INTRAVENOUS ONCE
Status: COMPLETED | OUTPATIENT
Start: 2023-03-13 | End: 2023-03-13

## 2023-03-13 RX ORDER — ONDANSETRON 2 MG/ML
4 INJECTION INTRAMUSCULAR; INTRAVENOUS EVERY 6 HOURS PRN
Status: DISCONTINUED | OUTPATIENT
Start: 2023-03-13 | End: 2023-03-16 | Stop reason: HOSPADM

## 2023-03-13 RX ORDER — LORAZEPAM 1 MG/1
3 TABLET ORAL
Status: DISCONTINUED | OUTPATIENT
Start: 2023-03-13 | End: 2023-03-16 | Stop reason: HOSPADM

## 2023-03-13 RX ORDER — ACETAMINOPHEN 650 MG/1
650 SUPPOSITORY RECTAL EVERY 6 HOURS PRN
Status: DISCONTINUED | OUTPATIENT
Start: 2023-03-13 | End: 2023-03-16 | Stop reason: HOSPADM

## 2023-03-13 RX ORDER — LORAZEPAM 2 MG/ML
4 INJECTION INTRAMUSCULAR
Status: DISCONTINUED | OUTPATIENT
Start: 2023-03-13 | End: 2023-03-16 | Stop reason: HOSPADM

## 2023-03-13 RX ORDER — ENOXAPARIN SODIUM 100 MG/ML
40 INJECTION SUBCUTANEOUS DAILY
Status: DISCONTINUED | OUTPATIENT
Start: 2023-03-13 | End: 2023-03-16 | Stop reason: HOSPADM

## 2023-03-13 RX ORDER — MAGNESIUM SULFATE 1 G/100ML
1000 INJECTION INTRAVENOUS ONCE
Status: COMPLETED | OUTPATIENT
Start: 2023-03-13 | End: 2023-03-13

## 2023-03-13 RX ORDER — LORAZEPAM 2 MG/ML
1 INJECTION INTRAMUSCULAR
Status: DISCONTINUED | OUTPATIENT
Start: 2023-03-13 | End: 2023-03-16 | Stop reason: HOSPADM

## 2023-03-13 RX ORDER — LORAZEPAM 1 MG/1
2 TABLET ORAL
Status: DISCONTINUED | OUTPATIENT
Start: 2023-03-13 | End: 2023-03-16 | Stop reason: HOSPADM

## 2023-03-13 RX ORDER — HYDRALAZINE HYDROCHLORIDE 20 MG/ML
20 INJECTION INTRAMUSCULAR; INTRAVENOUS
Status: DISCONTINUED | OUTPATIENT
Start: 2023-03-13 | End: 2023-03-16 | Stop reason: HOSPADM

## 2023-03-13 RX ORDER — SODIUM CHLORIDE 9 MG/ML
INJECTION, SOLUTION INTRAVENOUS PRN
Status: DISCONTINUED | OUTPATIENT
Start: 2023-03-13 | End: 2023-03-16 | Stop reason: SDUPTHER

## 2023-03-13 RX ORDER — LANOLIN ALCOHOL/MO/W.PET/CERES
100 CREAM (GRAM) TOPICAL DAILY
Status: DISCONTINUED | OUTPATIENT
Start: 2023-03-13 | End: 2023-03-13

## 2023-03-13 RX ORDER — MORPHINE SULFATE 2 MG/ML
2 INJECTION, SOLUTION INTRAMUSCULAR; INTRAVENOUS EVERY 4 HOURS PRN
Status: DISCONTINUED | OUTPATIENT
Start: 2023-03-13 | End: 2023-03-16 | Stop reason: HOSPADM

## 2023-03-13 RX ORDER — POTASSIUM CHLORIDE 20 MEQ/1
40 TABLET, EXTENDED RELEASE ORAL ONCE
Status: DISCONTINUED | OUTPATIENT
Start: 2023-03-13 | End: 2023-03-16 | Stop reason: HOSPADM

## 2023-03-13 RX ORDER — FOLIC ACID 5 MG/ML
1 INJECTION, SOLUTION INTRAMUSCULAR; INTRAVENOUS; SUBCUTANEOUS ONCE
Status: DISCONTINUED | OUTPATIENT
Start: 2023-03-13 | End: 2023-03-16 | Stop reason: HOSPADM

## 2023-03-13 RX ORDER — POTASSIUM CHLORIDE 7.45 MG/ML
10 INJECTION INTRAVENOUS
Status: DISPENSED | OUTPATIENT
Start: 2023-03-13 | End: 2023-03-13

## 2023-03-13 RX ORDER — CIPROFLOXACIN 2 MG/ML
400 INJECTION, SOLUTION INTRAVENOUS EVERY 12 HOURS
Status: DISCONTINUED | OUTPATIENT
Start: 2023-03-13 | End: 2023-03-14

## 2023-03-13 RX ORDER — LANOLIN ALCOHOL/MO/W.PET/CERES
100 CREAM (GRAM) TOPICAL DAILY
Status: DISCONTINUED | OUTPATIENT
Start: 2023-03-13 | End: 2023-03-16 | Stop reason: HOSPADM

## 2023-03-13 RX ORDER — 0.9 % SODIUM CHLORIDE 0.9 %
1000 INTRAVENOUS SOLUTION INTRAVENOUS ONCE
Status: COMPLETED | OUTPATIENT
Start: 2023-03-13 | End: 2023-03-13

## 2023-03-13 RX ORDER — MAGNESIUM SULFATE IN WATER 40 MG/ML
2000 INJECTION, SOLUTION INTRAVENOUS ONCE
Status: DISCONTINUED | OUTPATIENT
Start: 2023-03-13 | End: 2023-03-13

## 2023-03-13 RX ORDER — THIAMINE HYDROCHLORIDE 100 MG/ML
100 INJECTION, SOLUTION INTRAMUSCULAR; INTRAVENOUS ONCE
Status: COMPLETED | OUTPATIENT
Start: 2023-03-13 | End: 2023-03-13

## 2023-03-13 RX ORDER — SODIUM CHLORIDE 0.9 % (FLUSH) 0.9 %
5-40 SYRINGE (ML) INJECTION PRN
Status: DISCONTINUED | OUTPATIENT
Start: 2023-03-13 | End: 2023-03-16 | Stop reason: HOSPADM

## 2023-03-13 RX ORDER — ONDANSETRON 4 MG/1
4 TABLET, ORALLY DISINTEGRATING ORAL EVERY 8 HOURS PRN
Status: DISCONTINUED | OUTPATIENT
Start: 2023-03-13 | End: 2023-03-16 | Stop reason: HOSPADM

## 2023-03-13 RX ORDER — METRONIDAZOLE 500 MG/100ML
500 INJECTION, SOLUTION INTRAVENOUS EVERY 8 HOURS
Status: DISCONTINUED | OUTPATIENT
Start: 2023-03-13 | End: 2023-03-16 | Stop reason: HOSPADM

## 2023-03-13 RX ORDER — LORAZEPAM 2 MG/ML
3 INJECTION INTRAMUSCULAR
Status: DISCONTINUED | OUTPATIENT
Start: 2023-03-13 | End: 2023-03-16 | Stop reason: HOSPADM

## 2023-03-13 RX ORDER — PANTOPRAZOLE SODIUM 40 MG/10ML
40 INJECTION, POWDER, LYOPHILIZED, FOR SOLUTION INTRAVENOUS ONCE
Status: COMPLETED | OUTPATIENT
Start: 2023-03-13 | End: 2023-03-13

## 2023-03-13 RX ORDER — SODIUM CHLORIDE 9 MG/ML
INJECTION, SOLUTION INTRAVENOUS PRN
Status: DISCONTINUED | OUTPATIENT
Start: 2023-03-13 | End: 2023-03-16 | Stop reason: HOSPADM

## 2023-03-13 RX ORDER — LORAZEPAM 2 MG/ML
1 INJECTION INTRAMUSCULAR ONCE
Status: COMPLETED | OUTPATIENT
Start: 2023-03-13 | End: 2023-03-13

## 2023-03-13 RX ORDER — SODIUM CHLORIDE 0.9 % (FLUSH) 0.9 %
5-40 SYRINGE (ML) INJECTION EVERY 12 HOURS SCHEDULED
Status: DISCONTINUED | OUTPATIENT
Start: 2023-03-13 | End: 2023-03-15

## 2023-03-13 RX ORDER — POLYETHYLENE GLYCOL 3350 17 G/17G
17 POWDER, FOR SOLUTION ORAL DAILY PRN
Status: DISCONTINUED | OUTPATIENT
Start: 2023-03-13 | End: 2023-03-16 | Stop reason: HOSPADM

## 2023-03-13 RX ORDER — LORAZEPAM 1 MG/1
4 TABLET ORAL
Status: DISCONTINUED | OUTPATIENT
Start: 2023-03-13 | End: 2023-03-16 | Stop reason: HOSPADM

## 2023-03-13 RX ORDER — ACETAMINOPHEN 325 MG/1
650 TABLET ORAL EVERY 6 HOURS PRN
Status: DISCONTINUED | OUTPATIENT
Start: 2023-03-13 | End: 2023-03-16 | Stop reason: HOSPADM

## 2023-03-13 RX ORDER — SODIUM CHLORIDE 9 MG/ML
INJECTION, SOLUTION INTRAVENOUS CONTINUOUS
Status: DISCONTINUED | OUTPATIENT
Start: 2023-03-13 | End: 2023-03-14

## 2023-03-13 RX ORDER — LORAZEPAM 1 MG/1
1 TABLET ORAL
Status: DISCONTINUED | OUTPATIENT
Start: 2023-03-13 | End: 2023-03-16 | Stop reason: HOSPADM

## 2023-03-13 RX ADMIN — SODIUM CHLORIDE, PRESERVATIVE FREE 10 ML: 5 INJECTION INTRAVENOUS at 19:43

## 2023-03-13 RX ADMIN — HYDRALAZINE HYDROCHLORIDE 10 MG: 20 INJECTION INTRAMUSCULAR; INTRAVENOUS at 20:40

## 2023-03-13 RX ADMIN — SODIUM CHLORIDE 40 MG: 9 INJECTION, SOLUTION INTRAMUSCULAR; INTRAVENOUS; SUBCUTANEOUS at 13:00

## 2023-03-13 RX ADMIN — IOPAMIDOL 75 ML: 755 INJECTION, SOLUTION INTRAVENOUS at 02:55

## 2023-03-13 RX ADMIN — SODIUM CHLORIDE 1000 ML: 9 INJECTION, SOLUTION INTRAVENOUS at 01:00

## 2023-03-13 RX ADMIN — METRONIDAZOLE 500 MG: 500 INJECTION, SOLUTION INTRAVENOUS at 05:27

## 2023-03-13 RX ADMIN — LORAZEPAM 1 MG: 2 INJECTION INTRAMUSCULAR; INTRAVENOUS at 02:05

## 2023-03-13 RX ADMIN — SODIUM CHLORIDE: 9 INJECTION, SOLUTION INTRAVENOUS at 07:48

## 2023-03-13 RX ADMIN — PANTOPRAZOLE SODIUM 40 MG: 40 INJECTION, POWDER, FOR SOLUTION INTRAVENOUS at 01:32

## 2023-03-13 RX ADMIN — CIPROFLOXACIN 400 MG: 2 INJECTION, SOLUTION INTRAVENOUS at 14:47

## 2023-03-13 RX ADMIN — METRONIDAZOLE 500 MG: 500 INJECTION, SOLUTION INTRAVENOUS at 19:38

## 2023-03-13 RX ADMIN — THIAMINE HYDROCHLORIDE 100 MG: 100 INJECTION, SOLUTION INTRAMUSCULAR; INTRAVENOUS at 01:33

## 2023-03-13 RX ADMIN — SODIUM CHLORIDE: 9 INJECTION, SOLUTION INTRAVENOUS at 21:38

## 2023-03-13 RX ADMIN — POTASSIUM CHLORIDE 10 MEQ: 7.46 INJECTION, SOLUTION INTRAVENOUS at 07:46

## 2023-03-13 RX ADMIN — CIPROFLOXACIN 400 MG: 2 INJECTION, SOLUTION INTRAVENOUS at 23:36

## 2023-03-13 RX ADMIN — ONDANSETRON 4 MG: 2 INJECTION INTRAMUSCULAR; INTRAVENOUS at 01:32

## 2023-03-13 RX ADMIN — METRONIDAZOLE 500 MG: 500 INJECTION, SOLUTION INTRAVENOUS at 13:03

## 2023-03-13 RX ADMIN — ENOXAPARIN SODIUM 40 MG: 100 INJECTION SUBCUTANEOUS at 13:05

## 2023-03-13 RX ADMIN — SODIUM CHLORIDE 1000 ML: 9 INJECTION, SOLUTION INTRAVENOUS at 02:06

## 2023-03-13 RX ADMIN — SODIUM CHLORIDE, PRESERVATIVE FREE 10 ML: 5 INJECTION INTRAVENOUS at 19:36

## 2023-03-13 RX ADMIN — LORAZEPAM 1 MG: 1 TABLET ORAL at 13:04

## 2023-03-13 RX ADMIN — MAGNESIUM SULFATE IN DEXTROSE 1000 MG: 10 INJECTION, SOLUTION INTRAVENOUS at 05:28

## 2023-03-13 RX ADMIN — CEFTRIAXONE SODIUM 2000 MG: 2 INJECTION, POWDER, FOR SOLUTION INTRAMUSCULAR; INTRAVENOUS at 05:27

## 2023-03-13 RX ADMIN — Medication 100 MG: at 11:42

## 2023-03-13 ASSESSMENT — PAIN - FUNCTIONAL ASSESSMENT: PAIN_FUNCTIONAL_ASSESSMENT: 0-10

## 2023-03-13 ASSESSMENT — PAIN SCALES - GENERAL: PAINLEVEL_OUTOF10: 10

## 2023-03-13 NOTE — ED PROVIDER NOTES
[address]        Pt Name: Buster Dos Santos  MRN: [id number]  [place] 1970  Date of evaluation: 3/12/2023  Provider: Augustine Antonio DO  PCP: No primary care provider on file. Note Started: 12:11 AM EDT 3/13/23    CHIEF COMPLAINT       Chief Complaint   Patient presents with    Abdominal Pain     Patient to the ED for abdominal pain with blood in his stool and emesis. Patient states he hasn't been able to get in or out of bed. HISTORY OF PRESENT ILLNESS: 1 or more Elements   History From: Patient and EMS    Limitations to history : None    Buster Dos Santos is a 47 y.o. male who presents with concern for weakness, fatigue, abdominal pain, nausea, abdominal hernia with pain and weakness that has been worsening since Thursday. Nothing has made his complaints better or worse, not associated fevers or chills. The patient says that he has been unable to get out of bed over the past several days secondary to feeling so weak and fatigued and having abdominal pain. He has not been take anything for it. He does admit to drinking 4 four Loco's on a daily basis prior to Thursday when he began to feel so unwell. He also admits to smoking cigarettes daily. No other acute complaints. Nursing Notes were all reviewed and agreed with or any disagreements were addressed in the HPI. REVIEW OF SYSTEMS :      Positives and Pertinent negatives as per HPI.      SURGICAL HISTORY     Past Surgical History:   Procedure Laterality Date    MANDIBLE FRACTURE SURGERY         CURRENTMEDICATIONS       Previous Medications    NAPROXEN (NAPROSYN) 500 MG TABLET    Take 1 tablet by mouth 2 times daily as needed for Pain    ONDANSETRON (ZOFRAN) 4 MG TABLET    Take 1 tablet by mouth every 8 hours as needed for Nausea    PANTOPRAZOLE (PROTONIX) 20 MG TABLET    Take 1 tablet by mouth 2 times daily for 14 days       ALLERGIES     Patient has no known allergies. FAMILYHISTORY     No family history on file. SOCIAL HISTORY       Social History     Tobacco Use    Smoking status: Every Day     Packs/day: 1.00     Years: 27.00     Pack years: 27.00     Types: Cigarettes    Smokeless tobacco: Current   Substance Use Topics    Alcohol use: Yes     Comment: 6 four locos daily    Drug use: Not Currently     Types: Marijuana (Weed)       SCREENINGS        Jayden Coma Scale  Eye Opening: Spontaneous  Best Verbal Response: Oriented  Best Motor Response: Obeys commands  [place] Coma Scale Score: 15                CIWA Assessment  BP: (!) 141/104  Heart Rate: (!) 101  Nausea and Vomiting: no nausea and no vomiting  Tactile Disturbances: none  Tremor: 2  Auditory Disturbances: not present  Paroxysmal Sweats: no sweat visible  Visual Disturbances: not present  Anxiety: no anxiety, at ease  Headache, Fullness in Head: none present  Agitation: normal activity  Orientation and Clouding of Sensorium: oriented and can do serial additions  CIWA-Ar Total: 2           PHYSICAL EXAM  1 or more Elements     ED Triage Vitals   BP Temp Temp src Pulse Resp SpO2 Height Weight   -- -- -- -- -- -- -- --       Constitutional/General: Alert and oriented x3, very uncomfortable appearing and tremulous on exam  Head: Normocephalic and atraumatic  Eyes: PERRL, EOMI, conjunctiva normal, sclera non icteric  ENT:  Oropharynx clear, handling secretions  Neck: Supple, full ROM, no stridor  Respiratory: Wheezing noted bilaterally with no rales, or rhonchi. Not in respiratory distress  Cardiovascular:  Regular rate. Regular rhythm. 2+ distal pulses. Equal extremity pulses. Chest: No chest wall tenderness  GI:  Abdomen Soft, Non tender, Non distended. No rebound, guarding, or rigidity. Musculoskeletal: Moves all extremities x 4. Warm and well perfused, no cyanosis, no edema. Capillary refill <3 seconds  Integument: skin warm and dry. No rashes.    Neurologic: GCS 15, no focal deficits, symmetric strength 5/5 in the upper and lower extremities bilaterally  Psychiatric: Normal Affect      DIAGNOSTIC RESULTS   LABS:    Labs Reviewed   CBC WITH AUTO DIFFERENTIAL - Abnormal; Notable for the following components:       Result Value    WBC 17.7 (*)     MCHC 36.4 (*)     RDW 15.2 (*)     Neutrophils % 90.9 (*)     Lymphocytes % 5.8 (*)     Neutrophils Absolute 16.10 (*)     Lymphocytes Absolute 1.02 (*)     Eosinophils Absolute 0.01 (*)     All other components within normal limits   LIPASE - Abnormal; Notable for the following components:    Lipase 63 (*)     All other components within normal limits   URINALYSIS - Abnormal; Notable for the following components:    Color, UA ILANA (*)     All other components within normal limits   SERUM DRUG SCREEN - Abnormal; Notable for the following components:    Acetaminophen Level <5.0 (*)     All other components within normal limits   LACTIC ACID - Abnormal; Notable for the following components:    Lactic Acid 6.0 (*)     All other components within normal limits    Narrative:     CALL  [place]  H34 tel. ,  Chemistry results called to and read back by Madie Johnson RN, 03/13/2023  04:24, by Ashley Quintero   HEPATIC FUNCTION PANEL - Abnormal; Notable for the following components:    Alkaline Phosphatase 186 (*)     AST 51 (*)     Total Bilirubin 1.3 (*)     All other components within normal limits   BASIC METABOLIC PANEL - Abnormal; Notable for the following components:    Potassium 3.4 (*)     Chloride 96 (*)     CO2 15 (*)     Anion Gap 28 (*)     Glucose 183 (*)     Creatinine 0.6 (*)     All other components within normal limits   BASIC METABOLIC PANEL - Abnormal; Notable for the following components:    Potassium 3.2 (*)     Glucose 143 (*)     Creatinine 0.5 (*)     Calcium 7.7 (*)     All other components within normal limits   POCT GLUCOSE - Abnormal; Notable for the following components:    Meter Glucose 208 (*)     All other components within normal limits URINE DRUG SCREEN   MICROSCOPIC URINALYSIS   LACTIC ACID    Narrative:     Draw gray top tube-place on ice.~Separate plasma-keep cold. POCT GLUCOSE       As interpreted by me, the above displayed labs are abnormal. All other labs obtained during this visit were within normal range or not returned as of this dictation. RADIOLOGY:   Non-plain film images such as CT, Ultrasound and MRI are read by the radiologist. Plain radiographic images are visualized and preliminarily interpreted by the ED Provider with the below findings:    Interpretation per the Radiologist below, if available at the time of this note:    CT ABDOMEN PELVIS W IV CONTRAST Additional Contrast? None   Final Result   1. Colonic mucosal edema suggestive of acute colitis. 2. Hepatic steatosis. XR CHEST PORTABLE   Final Result   No acute process. No results found. No results found. PROCEDURES   Unless otherwise noted below, none    PAST MEDICAL HISTORY/Chronic Conditions Affecting Care      has a past medical history of Hernia.      EMERGENCY DEPARTMENT COURSE    Vitals:    Vitals:    03/13/23 0026 03/13/23 0159 03/13/23 0400 03/13/23 0459   BP: (!) 139/105 (!) 145/104  (!) 141/104   Pulse: (!) 117 (!) 115 (!) 112 (!) 101   Resp: 20 22  16   Temp: 97.4 °F (36.3 °C)      SpO2: 99% 96%  94%   Weight: 210 lb (95.3 kg)      Height: 6' (1.829 m)          Patient was given the following medications:  Medications   folic acid injection 1 mg (has no administration in time range)   sodium chloride flush 0.9 % injection 5-40 mL (has no administration in time range)   sodium chloride flush 0.9 % injection 5-40 mL (has no administration in time range)   0.9 % sodium chloride infusion (has no administration in time range)   thiamine tablet 100 mg (has no administration in time range)   LORazepam (ATIVAN) tablet 1 mg (has no administration in time range)     Or   LORazepam (ATIVAN) injection 1 mg (has no administration in time range) Or   LORazepam (ATIVAN) tablet 2 mg (has no administration in time range)     Or   LORazepam (ATIVAN) injection 2 mg (has no administration in time range)     Or   LORazepam (ATIVAN) tablet 3 mg (has no administration in time range)     Or   LORazepam (ATIVAN) injection 3 mg (has no administration in time range)     Or   LORazepam (ATIVAN) tablet 4 mg (has no administration in time range)     Or   LORazepam (ATIVAN) injection 4 mg (has no administration in time range)   magnesium sulfate 1000 mg in dextrose 5% 100 mL IVPB (1,000 mg IntraVENous New Bag 3/13/23 0528)   metronidazole (FLAGYL) 500 mg in 0.9% NaCl 100 mL IVPB premix (500 mg IntraVENous New Bag 3/13/23 0527)   potassium chloride 10 mEq/100 mL IVPB (Peripheral Line) (has no administration in time range)   pantoprazole (PROTONIX) injection 40 mg (40 mg IntraVENous Given 3/13/23 0132)   ondansetron (ZOFRAN) injection 4 mg (4 mg IntraVENous Given 3/13/23 0132)   0.9 % sodium chloride bolus (0 mLs IntraVENous Stopped 3/13/23 0311)   thiamine (B-1) injection 100 mg (100 mg IntraVENous Given 3/13/23 0133)   LORazepam (ATIVAN) injection 1 mg (1 mg IntraVENous Given 3/13/23 0205)   0.9 % sodium chloride bolus (0 mLs IntraVENous Stopped 3/13/23 0300)   iopamidol (ISOVUE-370) 76 % injection 75 mL (75 mLs IntraVENous Given 3/13/23 0255)   cefTRIAXone (ROCEPHIN) 2,000 mg in sterile water 20 mL IV syringe (2,000 mg IntraVENous Given 3/13/23 3487)       Medical Decision Making/Differential Diagnosis:  CC/HPI Summary, Social Determinants of health, Records Reviewed, DDx, testing done/not done, ED Course, Reassessment, disposition considerations/shared decision making with patient, consults, disposition:        CC/HPI Summary, DDx, ED Course, Reassessment, Tests Considered, Patient expectation:   73-year-old male with past medical history of alcohol abuse presenting today with concern for generalized abdominal pain, nausea, vomiting, bloody bowel movements.   The abdominal pain has been ongoing since Thursday, he is unable to get out of bed secondary to the pain and generalized weakness. Nothing is made it better or worse, not associate with fevers or chills. He has been having bloody bowel movements for a few weeks, he says it is more so black stools and bright red blood. He does drink approximately 4 4 Loco's a day until Thursday when he stopped because he was still not feeling well was unable to get up and go to the fridge. Hemodynamically stable on arrival to emergency department although he is tachycardic, he is uncomfortable appearing on exam.  Nontoxic-appearing. Differential diagnosis to include but not limited to alcohol withdrawal, sepsis, electrolyte abnormality. EKG without acute ST elevations or other concerning rhythm changes, lab work remarkable for a lactic of 6 that was obtained after fluid bolus. BMP demonstrating hypokalemia stable. Repleted in the emergency department. EKG had demonstrated prolonged QTc and was given magnesium in the emergency department as well. Hepatic panel without acute abnormalities, he is amatory leukocytosis 17.7 and a lipase of 63. CT abdomen pelvis demonstrating colitis. As the patient does have elevated lactic acid and leukocytosis, will treat with antibiotics with IV Rocephin and IV Flagyl. Also demonstrating concerns for alcohol ketoacidosis versus dehydration and will continue fluid resuscitation in the emergency department. Placed on CIWA protocol as he is likely in alcohol withdrawal as well. Patient be admitted for further evaluation management of his GI hemorrhage, alcohol withdrawal, colitis. Internal medicine accepted for admission and management. ED Course as of 03/13/23 0613   Mon Mar 13, 2023   0102 Chest x-ray as interpreted by me with no acute pneumothorax. [MM]   0118 Hemoccult positive. [MM]   0140 EKG: This EKG is signed and interpreted by me.     Rate: 125  Rhythm: Sinus  Interpretation: no acute changes, no acute ST elevations, normal axis, nonspecific ST changes, prolonged QTc at 510  Comparison: stable as compared to patient's most recent EKG 2/18/2023   [MM]   0426 Lactic Acid(!!): 6.0 [MM]   0538 Discussed with internal medicine and they will accept for admission and management. [MM]      ED Course User Index  [MM] Johana Rodriguez DO        CONSULTS: (Who and What was discussed)  IP CONSULT TO SOCIAL WORK  IP CONSULT TO INTERNAL MEDICINE    FINAL IMPRESSION      1. Pain of upper abdomen    2. Gastrointestinal hemorrhage, unspecified gastrointestinal hemorrhage type    3. Alcohol withdrawal syndrome with complication [facility])          DISPOSITION/PLAN     DISPOSITION Admitted 03/13/2023 05:40:12 AM      (Please note that portions of this note were completed with a voice recognition program.  Efforts were made to edit the dictations but occasionally words are mis-transcribed. )    Johana Rodriguez DO (electronically signed)            Johana Rodriguez DO  Resident  03/13/23 5409  Please refer to my separate ED provider note with my attestation and my separate documentation of the encounter.             Santa Rhoades MD  03/13/23 7118

## 2023-03-13 NOTE — CARE COORDINATION
Social Work /Transition of Care:    Pt presents to the ED secondary to abdominal pain, nausea, vomiting, and blood in his stool. Pt is from home. Pt is admitted inpatient with generalized abdominal pain. SW met with pt who was alert and oriented x4, lying in bed. Pt reports living alone in an apartment with approximately 21 steps up to the entrance. Pt is normally independent with all ADLs and drives. Pt reports he may be in danger of losing his apartment due to losing his job at [employer] in January. Pt reports he currently does not have income and also does not have insurance or a PCP. Pt reports he has not seen a PCP in over 20 years and he does not normally take any medication. SW arranged follow up appointment to [facility] ([phone]), [address] [place] [address], on Wednesday, March 22nd at 1045am.    Pt reports he was assessed to see if he is eligible for medicaid. Pt reports his last drink was on Thursday 3/9, when he had 3 -24oz Four Locos. Pt reports hx of completing detox and inpatient addiction treatment 2 years ago after a DUI. Pt declined any referrals for treatment at this time and stated his friend, Bk Villalobos, runs an addiction program ([facility]) and he can call him if he feels the need to go to treatment.

## 2023-03-13 NOTE — CONSULTS
GENERAL SURGERY  CONSULT NOTE  3/13/2023    Physician Consulted: Dr. Rosanne Barker  Reason for Consult: Colitis, lower GI bleed    [facility]  Namrata Rashid is a 47 y.o. male who presents for evaluation of abdominal pain and weakness. Patient states he has had unrelenting abdominal pain since Thursday described as mostly epigastric, and that he has been progressively more weak over the past several days. Pain did not go away after several days so the patient presented to the ED today. Patient also admits to several months of 10-20 diarrhea bowel movements per day and states that about half of his diarrhea bowel movements contain bright red blood. Patient says he is still been tolerating oral intake without any nausea or vomiting. Patient admits to several bowel movements earlier this morning and is still passing flatus. Patient denies upper having these symptoms prior to several months ago. Patient has no endoscopic or surgical history and denies use of blood thinners. Patient does admit to drinking 3-4 20+ ounce Four Loco malt liquor drinks daily. Patient states he is adopted and does not know any blood relatives or family history. Mildly tachycardic, normotensive  K3.2  LA 3.0 (6.0)  WBC 17.7  Hemoglobin 13.9 (within baseline range)    Past Medical History:   Diagnosis Date    Hernia        Past Surgical History:   Procedure Laterality Date    MANDIBLE FRACTURE SURGERY         Medications Prior to Admission    Prior to Admission medications    Not on File       No Known Allergies    No family history on file.     Social History     Tobacco Use    Smoking status: Every Day     Packs/day: 1.00     Years: 27.00     Pack years: 27.00     Types: Cigarettes    Smokeless tobacco: Current   Substance Use Topics    Alcohol use: Yes     Comment: 6 four locos daily    Drug use: Not Currently     Types: Marijuana Alicia Ege)         Review of Systems: pertinent ROS listed in HPI, all others negative       PHYSICAL EXAM:    Vitals:    03/13/23 1651   BP: (!) 138/99   Pulse: 100   Resp: 18   Temp: 98 °F (36.7 °C)   SpO2:        GENERAL:  NAD. A&Ox3. HEAD:  Normocephalic. Atraumatic. EYES:   No scleral icterus. PERRL. LUNGS:  No increased work of breathing. CARDIOVASCULAR: RR  ABDOMEN:  Soft, mildly distended, non-tender. No guarding, rigidity, rebound. Nonperitoneal  EXTREMITIES:   MAEx4. Atraumatic. No LE edema. SKIN:  Warm and dry  NEUROLOGIC:  GCS 15  RECTAL: No hemorrhoids. FOBT positive, no melena or bright red blood or masses or lesions noted. .    ASSESSMENT/PLAN:  47 y.o. male with colitis and several months of GI symptoms    Monitor H/H  Serial abdominal exams  Continue antibiotics  IV fluids  With patient's hemoglobin steady and within baseline and not showing active signs of bleeding, no plans for acute endoscopic intervention. However with the patient's history, symptoms, lack of any previous scopes, recommend that the patient follow-up as an outpatient for EGD and colonoscopy. Plan discussed with Dr. Chapin Malcolm Congress, DO  Surgery Resident PGY-1  3/13/2023  5:02 PM

## 2023-03-13 NOTE — ED PROVIDER NOTES
I was the primary provider for patient. Patient with history of alcohol abuse history of tobacco use and history of ulcers presenting here because of upper abdominal pain. Patient reports he has been feeling weak and has not been able to get out of his bed for the last 3 to 4 days. Patient reporting no fall or injury. Patient reporting he has been having blood in his stool for over a year. Patient reporting he does drink and he last drank on Thursday. Patient reporting no productive cough or fever he reports no chest pain. Reports no leg pain. Patient is awake alert oriented x3 heart exam normal lungs are clear abdomen he is mainly tender upper abdomen there is no lower abdominal tenderness he has normal strength in all extremities. Patient pupils are equal and reactive conjunctive are not pale. Patient differential includes GI bleed secondary to peptic ulcer disease as well as diverticulosis as well as perforated viscus as well as electrolyte imbalance. Patient was ordered normal saline 1 L IV Protonix 40 mg IV. EKG: This EKG is signed and interpreted by me. Rate: 125  Rhythm: Sinus tachycardia  Interpretation: no acute changes  Comparison: 2/18/23  Patient white count was 17.7 hemoglobin was 13.9  Patient sodium was 139 potassium was 3.4. BUN was 7 creatinine 0.6. Alk phos was 186 bilirubin was 1.3 lipase is 63. Chest x-ray showed no infiltrate. Patient did undergo Hemoccult was positive. Patient did undergo CT abdomen pelvis which showed colitis. Patient was given over 2 L of fluid and repeat lactic acid improved and it went down to 3. Patient was given Ativan for suspected withdrawal.  Patient had CIWA protocol ordered. He was also ordered thiamine 100 mg IV. Patient also on Rocephin IV as well as Flagyl 500 mg IV. Patient reporting abdominal pain has improved while here in the emergency department. Patient was made aware of plan. Heart rate has come down.   Patient blood pressure is stable. Patient will be admitted I did speak to [address] and he will admit.       --------------------------------- IMPRESSION AND DISPOSITION ---------------------------------    IMPRESSION  1. Pain of upper abdomen    2. Gastrointestinal hemorrhage, unspecified gastrointestinal hemorrhage type    3. Alcohol withdrawal syndrome with complication ([facility] [address]    4. Colitis    5.  Lactic acidosis        DISPOSITION  Disposition: Admit to telemetry  Patient condition is stable    Mary Montanez MD  03/13/23 0009       Mary Montanez MD  03/13/23 [address] Rea MD  03/13/23 1336

## 2023-03-13 NOTE — H&P
Hospital Medicine History & Physical      PCP: No primary care provider on file. Date of Admission: 3/13/2023    Date of Service: Pt seen/examined on 3/13/2023 and admitted to Inpatient with expected LOS greater than two midnights due to medical therapy. Chief Complaint: My belly hurts      History Of Present Illness:    Mr. Aarti Agee, a 47y.o. year old male patient with history of ventral hernia, alcohol abuse, peptic ulcer disease who comes into the hospital with complaints of intractable abdominal pain. His abdominal pain is mostly epigastric and it has progressively gotten worse since this past Thursday. He consumes around 4-5 beers on a daily basis. He also has been having intractable nausea, vomiting and also has had some hematochezia. Since her symptoms were not subsiding he came to the ER and CT abdomen was done which showed concern for colitis. Patient also was noted to have lactic acidosis and leukocytosis. Initially he also had significant abdominal tenderness. He continues to have some hematochezia which she describes as bright red bleeding. No hematemesis noted. Past Medical History:          Diagnosis Date    Hernia        Past Surgical History:          Procedure Laterality Date    MANDIBLE FRACTURE SURGERY         Medications Prior to Admission:      Prior to Admission medications    Medication Sig Start Date End Date Taking? Authorizing Provider   ondansetron (ZOFRAN) 4 MG tablet Take 1 tablet by mouth every 8 hours as needed for Nausea  Patient not taking: Reported on 3/13/2023 2/18/23   DANNA Crocker CNP   pantoprazole (PROTONIX) 20 MG tablet Take 1 tablet by mouth 2 times daily for 14 days 2/18/23 3/4/23  DANNA Crocker CNP   naproxen (NAPROSYN) 500 MG tablet Take 1 tablet by mouth 2 times daily as needed for Pain  Patient not taking: No sig reported 3/21/22   DANNA Bolanos CNP       Allergies:  Patient has no known allergies.     Social History:    The patient currently lives at home  TOBACCO:   reports that he has been smoking cigarettes. He has a 27.00 pack-year smoking history. He uses smokeless tobacco.  ETOH:   reports current alcohol use. Family History:    Noncontributory    REVIEW OF SYSTEMS:   Pertinent positives as noted in the HPI. All other systems reviewed and negative. PHYSICAL EXAM:  BP (!) 135/101   Pulse (!) 109   Temp 97.4 °F (36.3 °C)   Resp 21   Ht 6' (1.829 m)   Wt 210 lb (95.3 kg)   SpO2 96%   BMI 28.48 kg/m²     HEENT: No pallor, no icterus. Neck: Supple. No lesions, scars or masses. Trachea midline. Respiratory:  CTA, good air entry. Cardiovascular: RRR, no murmur. Abdomen: Soft, nontender, ventral hernia noted, reducible  Musculoskeletal: No joint pains or joint swelling noted. Neurologic: awake, alert and following commands     Labs:     Recent Labs     03/13/23 0105   WBC 17.7*   HGB 13.9   HCT 38.2        Recent Labs     03/13/23 0105 03/13/23  0533    136   K 3.4* 3.2*   CL 96* 101   CO2 15* 22   BUN 7 7   CREATININE 0.6* 0.5*   CALCIUM 8.9 7.7*     Recent Labs     03/13/23 0105   AST 51*   ALT 25   BILIDIR 0.3   BILITOT 1.3*   ALKPHOS 186*     No results for input(s): INR in the last 72 hours. No results for input(s): Dafne Hoxie in the last 72 hours. Urinalysis:      Lab Results   Component Value Date/Time    NITRU Negative 03/13/2023 01:05 AM    WBCUA NONE 03/13/2023 01:05 AM    BACTERIA NONE SEEN 03/13/2023 01:05 AM    RBCUA 0-1 03/13/2023 01:05 AM    BLOODU TRACE-INTACT 03/13/2023 01:05 AM    SPECGRAV <=1.005 03/13/2023 01:05 AM    GLUCOSEU Negative 03/13/2023 01:05 AM       Imaging:  CT ABDOMEN PELVIS W IV CONTRAST Additional Contrast? None   Final Result   1. Colonic mucosal edema suggestive of acute colitis. 2. Hepatic steatosis. XR CHEST PORTABLE   Final Result   No acute process.                  ASSESSMENT:  Colitis  Lower GI bleed  Alcohol abuse  Hypokalemia  Ventral hernia  Tobacco abuse    Plan:  Admit patient to hospital, keep n.p.o., continue with empiric broad-spectrum antibiotics, IV fluids, consult surgery for further recommendations  Pain control with IV morphine  IV PPI  Continue to monitor H&H and transfuse as needed  Continue with alcohol withdrawal protocol, alcohol cessation advised  Replace potassium  Smoking cessation advised    Body mass index is 28.48 kg/m². DVT Prophylaxis  SCD    Diet  Diet NPO    Code Status  Full Code    Electronically signed by Brisa Krause MD on 3/13/2023 at 12:21 PM  Please contact me via [employer]        NOTE: This report was transcribed using voice recognition software. Every effort was made to ensure accuracy; however, inadvertent computerized transcription errors may be present.

## 2023-03-13 NOTE — PLAN OF CARE
Problem: Discharge Planning  Goal: Discharge to home or other facility with appropriate resources  Outcome: Progressing     Problem: Pain  Goal: Verbalizes/displays adequate comfort level or baseline comfort level  Outcome: Progressing     Problem: Safety - Adult  Goal: Free from fall injury  Outcome: Progressing     Problem: Risk for Elopement  Goal: Patient will not exit the unit/facility without proper excort  Outcome: Progressing  Flowsheets (Taken 3/13/2023 1600)  Nursing Interventions for Elopement Risk:   Collaborate with family members/caregivers to mitigate the elopement risk   Assist with personal care needs such as toileting, eating, dressing, as needed to reduce the risk of wandering   Collaborate with treatment team for drug withdrawal symptoms treatment   Collaborate with treatment team for nicotine replacement   Communicate/escalate to charge nurse the risk of elopement   Communicate/escalate to /other team member the risk of elopement   Communicate/escalate to nursing supervisor the risk of elopement   Escort with two staff members if patient must leave the unit   Make sure patient has all necessary personal care items   Communicate to physician the risk for elopement   Place patient in room far away from exits and stairways   Shoes and clothing collected and placed in gown attire   Reduce environmental triggers

## 2023-03-14 LAB
ANION GAP SERPL CALCULATED.3IONS-SCNC: 13 MMOL/L (ref 7–16)
BASOPHILS ABSOLUTE: 0.03 E9/L (ref 0–0.2)
BASOPHILS RELATIVE PERCENT: 0.3 % (ref 0–2)
BUN BLDV-MCNC: 4 MG/DL (ref 6–20)
CALCIUM SERPL-MCNC: 7.7 MG/DL (ref 8.6–10.2)
CHLORIDE BLD-SCNC: 101 MMOL/L (ref 98–107)
CO2: 24 MMOL/L (ref 22–29)
CREAT SERPL-MCNC: 0.5 MG/DL (ref 0.7–1.2)
EOSINOPHILS ABSOLUTE: 0.02 E9/L (ref 0.05–0.5)
EOSINOPHILS RELATIVE PERCENT: 0.2 % (ref 0–6)
GFR SERPL CREATININE-BSD FRML MDRD: >60 ML/MIN/1.73
GLUCOSE BLD-MCNC: 105 MG/DL (ref 74–99)
HCT VFR BLD CALC: 31 % (ref 37–54)
HEMOGLOBIN: 10.8 G/DL (ref 12.5–16.5)
IMMATURE GRANULOCYTES #: 0.05 E9/L
IMMATURE GRANULOCYTES %: 0.6 % (ref 0–5)
LYMPHOCYTES ABSOLUTE: 2.49 E9/L (ref 1.5–4)
LYMPHOCYTES RELATIVE PERCENT: 28.1 % (ref 20–42)
MAGNESIUM: 1.6 MG/DL (ref 1.6–2.6)
MCH RBC QN AUTO: 33.8 PG (ref 26–35)
MCHC RBC AUTO-ENTMCNC: 34.8 % (ref 32–34.5)
MCV RBC AUTO: 96.9 FL (ref 80–99.9)
MONOCYTES ABSOLUTE: 0.43 E9/L (ref 0.1–0.95)
MONOCYTES RELATIVE PERCENT: 4.9 % (ref 2–12)
NEUTROPHILS ABSOLUTE: 5.84 E9/L (ref 1.8–7.3)
NEUTROPHILS RELATIVE PERCENT: 65.9 % (ref 43–80)
PDW BLD-RTO: 15.8 FL (ref 11.5–15)
PLATELET # BLD: 150 E9/L (ref 130–450)
PMV BLD AUTO: 11.1 FL (ref 7–12)
POTASSIUM REFLEX MAGNESIUM: 2.8 MMOL/L (ref 3.5–5)
POTASSIUM SERPL-SCNC: 3.3 MMOL/L (ref 3.5–5)
RBC # BLD: 3.2 E12/L (ref 3.8–5.8)
SODIUM BLD-SCNC: 138 MMOL/L (ref 132–146)
WBC # BLD: 8.9 E9/L (ref 4.5–11.5)

## 2023-03-14 PROCEDURE — 2060000000 HC ICU INTERMEDIATE R&B

## 2023-03-14 PROCEDURE — 99232 SBSQ HOSP IP/OBS MODERATE 35: CPT | Performed by: SURGERY

## 2023-03-14 PROCEDURE — 2580000003 HC RX 258: Performed by: STUDENT IN AN ORGANIZED HEALTH CARE EDUCATION/TRAINING PROGRAM

## 2023-03-14 PROCEDURE — 83735 ASSAY OF MAGNESIUM: CPT

## 2023-03-14 PROCEDURE — 6370000000 HC RX 637 (ALT 250 FOR IP): Performed by: INTERNAL MEDICINE

## 2023-03-14 PROCEDURE — A4216 STERILE WATER/SALINE, 10 ML: HCPCS | Performed by: INTERNAL MEDICINE

## 2023-03-14 PROCEDURE — 2580000003 HC RX 258: Performed by: EMERGENCY MEDICINE

## 2023-03-14 PROCEDURE — 36415 COLL VENOUS BLD VENIPUNCTURE: CPT

## 2023-03-14 PROCEDURE — 6370000000 HC RX 637 (ALT 250 FOR IP): Performed by: FAMILY MEDICINE

## 2023-03-14 PROCEDURE — 2580000003 HC RX 258: Performed by: INTERNAL MEDICINE

## 2023-03-14 PROCEDURE — C9113 INJ PANTOPRAZOLE SODIUM, VIA: HCPCS | Performed by: INTERNAL MEDICINE

## 2023-03-14 PROCEDURE — 6360000002 HC RX W HCPCS: Performed by: INTERNAL MEDICINE

## 2023-03-14 PROCEDURE — 2500000003 HC RX 250 WO HCPCS: Performed by: INTERNAL MEDICINE

## 2023-03-14 PROCEDURE — 6370000000 HC RX 637 (ALT 250 FOR IP): Performed by: STUDENT IN AN ORGANIZED HEALTH CARE EDUCATION/TRAINING PROGRAM

## 2023-03-14 PROCEDURE — 6360000002 HC RX W HCPCS: Performed by: FAMILY MEDICINE

## 2023-03-14 PROCEDURE — 80048 BASIC METABOLIC PNL TOTAL CA: CPT

## 2023-03-14 PROCEDURE — 6360000002 HC RX W HCPCS: Performed by: STUDENT IN AN ORGANIZED HEALTH CARE EDUCATION/TRAINING PROGRAM

## 2023-03-14 PROCEDURE — 84132 ASSAY OF SERUM POTASSIUM: CPT

## 2023-03-14 PROCEDURE — 85025 COMPLETE CBC W/AUTO DIFF WBC: CPT

## 2023-03-14 RX ORDER — SODIUM CHLORIDE AND POTASSIUM CHLORIDE 150; 900 MG/100ML; MG/100ML
INJECTION, SOLUTION INTRAVENOUS CONTINUOUS
Status: DISCONTINUED | OUTPATIENT
Start: 2023-03-14 | End: 2023-03-16 | Stop reason: HOSPADM

## 2023-03-14 RX ORDER — POTASSIUM CHLORIDE 7.45 MG/ML
10 INJECTION INTRAVENOUS
Status: DISCONTINUED | OUTPATIENT
Start: 2023-03-14 | End: 2023-03-14 | Stop reason: SDUPTHER

## 2023-03-14 RX ORDER — POTASSIUM CHLORIDE 20 MEQ/1
40 TABLET, EXTENDED RELEASE ORAL ONCE
Status: COMPLETED | OUTPATIENT
Start: 2023-03-14 | End: 2023-03-14

## 2023-03-14 RX ORDER — MAGNESIUM SULFATE IN WATER 40 MG/ML
4000 INJECTION, SOLUTION INTRAVENOUS ONCE
Status: COMPLETED | OUTPATIENT
Start: 2023-03-14 | End: 2023-03-14

## 2023-03-14 RX ORDER — POTASSIUM CHLORIDE 7.45 MG/ML
10 INJECTION INTRAVENOUS
Status: COMPLETED | OUTPATIENT
Start: 2023-03-14 | End: 2023-03-15

## 2023-03-14 RX ADMIN — SODIUM CHLORIDE, PRESERVATIVE FREE 10 ML: 5 INJECTION INTRAVENOUS at 20:40

## 2023-03-14 RX ADMIN — SODIUM CHLORIDE, PRESERVATIVE FREE 10 ML: 5 INJECTION INTRAVENOUS at 22:57

## 2023-03-14 RX ADMIN — CEFTRIAXONE SODIUM 2000 MG: 2 INJECTION, POWDER, FOR SOLUTION INTRAMUSCULAR; INTRAVENOUS at 08:07

## 2023-03-14 RX ADMIN — MAGNESIUM SULFATE HEPTAHYDRATE 4000 MG: 40 INJECTION, SOLUTION INTRAVENOUS at 11:39

## 2023-03-14 RX ADMIN — POTASSIUM CHLORIDE 40 MEQ: 1500 TABLET, EXTENDED RELEASE ORAL at 06:55

## 2023-03-14 RX ADMIN — HYDRALAZINE HYDROCHLORIDE 20 MG: 20 INJECTION INTRAMUSCULAR; INTRAVENOUS at 08:14

## 2023-03-14 RX ADMIN — POTASSIUM CHLORIDE 40 MEQ: 1500 TABLET, EXTENDED RELEASE ORAL at 11:43

## 2023-03-14 RX ADMIN — METRONIDAZOLE 500 MG: 500 INJECTION, SOLUTION INTRAVENOUS at 20:39

## 2023-03-14 RX ADMIN — POTASSIUM CHLORIDE AND SODIUM CHLORIDE: 900; 150 INJECTION, SOLUTION INTRAVENOUS at 17:48

## 2023-03-14 RX ADMIN — POLYETHYLENE GLYCOL-3350 AND ELECTROLYTES 4000 ML: 236; 6.74; 5.86; 2.97; 22.74 POWDER, FOR SOLUTION ORAL at 14:30

## 2023-03-14 RX ADMIN — SODIUM CHLORIDE 40 MG: 9 INJECTION, SOLUTION INTRAMUSCULAR; INTRAVENOUS; SUBCUTANEOUS at 08:07

## 2023-03-14 RX ADMIN — POTASSIUM CHLORIDE 10 MEQ: 10 INJECTION, SOLUTION INTRAVENOUS at 16:07

## 2023-03-14 RX ADMIN — POTASSIUM CHLORIDE 10 MEQ: 10 INJECTION, SOLUTION INTRAVENOUS at 11:41

## 2023-03-14 RX ADMIN — Medication 100 MG: at 08:08

## 2023-03-14 RX ADMIN — POTASSIUM CHLORIDE 10 MEQ: 10 INJECTION, SOLUTION INTRAVENOUS at 19:05

## 2023-03-14 RX ADMIN — METRONIDAZOLE 500 MG: 500 INJECTION, SOLUTION INTRAVENOUS at 17:59

## 2023-03-14 RX ADMIN — ENOXAPARIN SODIUM 40 MG: 100 INJECTION SUBCUTANEOUS at 08:08

## 2023-03-14 RX ADMIN — POTASSIUM CHLORIDE 10 MEQ: 10 INJECTION, SOLUTION INTRAVENOUS at 18:00

## 2023-03-14 RX ADMIN — METRONIDAZOLE 500 MG: 500 INJECTION, SOLUTION INTRAVENOUS at 05:11

## 2023-03-14 NOTE — PROGRESS NOTES
Hospitalist Progress Note      SYNOPSIS: Patient admitted on 3/13/2023 for Generalized abdominal pain      SUBJECTIVE:    Patient seen and examined. Patient is doing okay mention he is drinking his bowel prep okay and he is hoping to finish eat by this evening so he can have his colonoscopy. His abdomen pain is much improved compared to admission when he he rated his pain a 7 and now currently a 1 or 2. Records reviewed. 61-year-old male past medical history of alcohol abuse, ventral hernia, peptic ulcer disease presented to the hospital with intractable abdominal pain. Epigastric region he does drink 4-5 beers daily. Also has been having hematochezia and intractable nausea vomiting. CT abdomen pelvis that showed concern for colitis 6. General surgery is currently consulted at this no plan for endoscopic procedure recommend outpatient follow-up for EGD colonoscopy. Also has had electrolyte abnormalities with low potassium. Started on antibiotics. Also on alcohol withdrawal protocol. Stable overnight. No other overnight issues reported. Temp (24hrs), Av °F (36.7 °C), Min:97.4 °F (36.3 °C), Max:98.3 °F (36.8 °C)    DIET: ADULT DIET; Clear Liquid  Diet NPO Exceptions are: Sips of Water with Meds  CODE: Full Code  No intake or output data in the 24 hours ending 23 1044    OBJECTIVE:    BP (!) 153/118   Pulse 80   Temp 98.3 °F (36.8 °C) (Oral)   Resp 18   Ht 6' (1.829 m)   Wt 210 lb (95.3 kg)   SpO2 96%   BMI 28.48 kg/m²     General appearance: No apparent distress, appears stated age and cooperative. HEENT:  Conjunctivae/corneas clear. Neck: Supple. No jugular venous distention. Respiratory: Clear to auscultation bilaterally, normal respiratory effort  Cardiovascular: Regular rate rhythm, normal S1-S2  Abdomen: Soft, nontender, nondistended  Musculoskeletal: No clubbing, cyanosis, no bilateral lower extremity edema. Brisk capillary refill.    Skin:  No rashes  on visible skin  Neurologic: awake, alert and following commands     ASSESSMENT:  Colitis  Hypokalemia  Alcohol abuse with withdrawal  Hematochezia       PLAN:  Continue clear liquid diet for now and advance diet as tolerated. General surgery following has been made n.p.o. for midnight tomorrow. Potassium 2.8 replace via IV and orally. We will add some potassium to IV fluids as potassium has been on the low side. Give 4 g magnesium magnesium in the low side at 1.6. Continue alcohol withdrawal protocol.       DISPOSITION:     Medications:  REVIEWED DAILY    Infusion Medications    0.9% NaCl with KCl 20 mEq      sodium chloride 100 mL/hr at 03/13/23 0748    sodium chloride       Scheduled Medications    cefTRIAXone (ROCEPHIN) IV  2,000 mg IntraVENous Q24H    polyethylene glycol  4,000 mL Oral Once    potassium chloride  10 mEq IntraVENous Q1H    potassium chloride  40 mEq Oral Once    potassium chloride  10 mEq IntraVENous B8P    folic acid  1 mg IntraVENous Once    sodium chloride flush  5-40 mL IntraVENous 2 times per day    sodium chloride flush  5-40 mL IntraVENous 2 times per day    metroNIDAZOLE  500 mg IntraVENous Q8H    potassium chloride  40 mEq Oral Once    enoxaparin  40 mg SubCUTAneous Daily    thiamine  100 mg Oral Daily    pantoprazole (PROTONIX) 40 mg injection  40 mg IntraVENous Daily     PRN Meds: sodium chloride flush, sodium chloride, LORazepam **OR** LORazepam **OR** LORazepam **OR** LORazepam **OR** LORazepam **OR** LORazepam **OR** LORazepam **OR** LORazepam, sodium chloride flush, sodium chloride, ondansetron **OR** ondansetron, polyethylene glycol, acetaminophen **OR** acetaminophen, morphine, hydrALAZINE, hydrALAZINE    Labs:     Recent Labs     03/13/23  0105 03/14/23  0442   WBC 17.7* 8.9   HGB 13.9 10.8*   HCT 38.2 31.0*    150       Recent Labs     03/13/23  0105 03/13/23  0533 03/14/23  0442    136 138   K 3.4* 3.2* 2.8*   CL 96* 101 101   CO2 15* 22 24   BUN 7 7 4*   CREATININE 0.6* 0.5* 0.5*   CALCIUM 8.9 7.7* 7.7*       Recent Labs     03/13/23  0105   PROT 6.7   ALKPHOS 186*   ALT 25   AST 51*   BILITOT 1.3*   LIPASE 63*       No results for input(s): INR in the last 72 hours. No results for input(s): Sara Willett in the last 72 hours. Chronic labs:    Lab Results   Component Value Date    CHOL 224 (H) 07/03/2018    TRIG 94 07/03/2018    HDL 59 07/03/2018    LDLCALC 146 (H) 07/03/2018    TSH 1.730 07/01/2018    INR 1.1 02/18/2023    LABA1C 5.9 (H) 07/03/2018       Radiology: REVIEWED DAILY    +++++++++++++++++++++++++++++++++++++++++++++++++  Robert Espana MD  [facility] Physician - [address] [place] [address]  +++++++++++++++++++++++++++++++++++++++++++++++++  NOTE: This report was transcribed using voice recognition software. Every effort was made to ensure accuracy; however, inadvertent computerized transcription errors may be present.

## 2023-03-14 NOTE — PLAN OF CARE
Problem: Discharge Planning  Goal: Discharge to home or other facility with appropriate resources  3/14/2023 0513 by Dennie Ice, RN  Outcome: Progressing  3/13/2023 1653 by Dotti Aschoff, RN  Outcome: Progressing     Problem: Pain  Goal: Verbalizes/displays adequate comfort level or baseline comfort level  3/14/2023 0513 by Dennie Ice, RN  Outcome: Bryce Jules  3/13/2023 1653 by Dotti Aschoff, RN  Outcome: Progressing     Problem: Safety - Adult  Goal: Free from fall injury  3/14/2023 0513 by Dennie Ice, RN  Outcome: Progressing  3/13/2023 1653 by Dotti Aschoff, RN  Outcome: Progressing     Problem: Risk for Elopement  Goal: Patient will not exit the unit/facility without proper excort  3/14/2023 0513 by Dennie Ice, RN  Outcome: Progressing  Flowsheets (Taken 3/13/2023 2000)  Nursing Interventions for Elopement Risk:   Collaborate with family members/caregivers to mitigate the elopement risk   Assist with personal care needs such as toileting, eating, dressing, as needed to reduce the risk of wandering   Collaborate with treatment team for drug withdrawal symptoms treatment   Collaborate with treatment team for nicotine replacement   Communicate/escalate to charge nurse the risk of elopement   Communicate/escalate to /other team member the risk of elopement   Communicate/escalate to nursing supervisor the risk of elopement   Escort with two staff members if patient must leave the unit   Make sure patient has all necessary personal care items   Communicate to physician the risk for elopement   Place patient in room far away from exits and stairways   Shoes and clothing collected and placed in gown attire   Reduce environmental triggers  3/13/2023 1653 by Dotti Aschoff, RN  Outcome: Progressing  Flowsheets (Taken 3/13/2023 1600)  Nursing Interventions for Elopement Risk:   Collaborate with family members/caregivers to mitigate the elopement risk   Assist with personal care needs such as toileting, eating, dressing, as needed to reduce the risk of wandering   Collaborate with treatment team for drug withdrawal symptoms treatment   Collaborate with treatment team for nicotine replacement   Communicate/escalate to charge nurse the risk of elopement   Communicate/escalate to /other team member the risk of elopement   Communicate/escalate to nursing supervisor the risk of elopement   Escort with two staff members if patient must leave the unit   Make sure patient has all necessary personal care items   Communicate to physician the risk for elopement   Place patient in room far away from exits and stairways   Shoes and clothing collected and placed in gown attire   Reduce environmental triggers

## 2023-03-14 NOTE — CARE COORDINATION
CM note. Pt admitted to PICU with generalized abdominal pain. General surgery on consult. Has a palpable mass at 6 cm in the rectum. Plan is colonoscopy. CLD, to start Golytely  prep today. Rocephin and flagyl IV continued. Met with pt. He is up in the room independently. Plan is to return to home at d/c. No home going needs identified at this time. Pt will call his Uncle for a ride at d/c.  CM/SW to follow. Uma Alcantara RN [facility] [phone].

## 2023-03-14 NOTE — ACP (ADVANCE CARE PLANNING)
Advance Care Planning   Healthcare Decision Maker:      Click here to complete Healthcare Decision Makers including selection of the Healthcare Decision Maker Relationship (ie \"Primary\"). Today we documented Decision Maker(s) consistent with Legal Next of Kin hierarchy.        If the relationship to the patient does NOT follow our state's Next of Kin hierarchy, the patient MUST complete an ACP Document to allow him/her to act on the patient's behalf. :

## 2023-03-14 NOTE — PROGRESS NOTES
[facility]   ATTENDING PHYSICIAN PROGRESS NOTE     I have examined the patient, reviewed the record, and discussed the case with the APN/ Resident. I have reviewed all relevant labs and imaging data. Please refer to the APN/ resident's note. I agree with the assessment and plan with the following corrections/ additions. The following summarizes my clinical findings and independent assessment. CC: colitis; lower GI bleed    Objective         Vitals Last 24 Hours:  TEMPERATURE:  Temp  Av.2 °F (36.8 °C)  Min: 98 °F (36.7 °C)  Max: 98.3 °F (36.8 °C)  RESPIRATIONS RANGE: Resp  Av.5  Min: 18  Max: 20  PULSE OXIMETRY RANGE: SpO2  Av %  Min: 96 %  Max: 96 %  PULSE RANGE: Pulse  Av.5  Min: 80  Max: 106  BLOOD PRESSURE RANGE: Systolic (79TZE), WT , Min:136 , NAGY:690   ; Diastolic (43EFL), IW, Min:91, Max:118    I/O (24Hr): Intake/Output Summary (Last 24 hours) at 3/14/2023 1634  Last data filed at 3/14/2023 1407  Gross per 24 hour   Intake 680 ml   Output --   Net 680 ml     Objective:  General Appearance:  Comfortable, in no acute distress and ill-appearing. Vital signs: (most recent): Blood pressure (!) 136/91, pulse 100, temperature 98.1 °F (36.7 °C), resp. rate 20, height 6' (1.829 m), weight 210 lb (95.3 kg), SpO2 96 %. Lungs:  Normal effort and normal respiratory rate. Breath sounds clear to auscultation. He is not in respiratory distress. Heart: Normal rate. Regular rhythm. No murmur. Abdomen: Abdomen is soft and non-distended. Bowel sounds are normal.   There is no abdominal tenderness. Neurological: Patient is alert and oriented to person, place and time. Skin:  Warm and dry.     Labs/Imaging/Diagnostics    Labs:  personally reviewed  CBC:  Recent Labs     23  0105 23  0442   WBC 17.7* 8.9   RBC 4.11 3.20*   HGB 13.9 10.8*   HCT 38.2 31.0*   MCV 92.9 96.9   RDW 15.2* 15.8*    150     CHEMISTRIES:  Recent Labs 03/13/23  0105 03/13/23  0533 03/14/23  0442 03/14/23  1508    136 138  --    K 3.4* 3.2* 2.8* 3.3*   CL 96* 101 101  --    CO2 15* 22 24  --    BUN 7 7 4*  --    CREATININE 0.6* 0.5* 0.5*  --    GLUCOSE 183* 143* 105*  --    MG  --   --  1.6  --      PT/INR:No results for input(s): PROTIME, INR in the last 72 hours. APTT:No results for input(s): APTT in the last 72 hours. LIVER PROFILE:  Recent Labs     03/13/23 0105   AST 51*   ALT 25   BILIDIR 0.3   BILITOT 1.3*   ALKPHOS 186*       Imaging Last 24 Hours:  CT ABDOMEN PELVIS W IV CONTRAST Additional Contrast? None    Result Date: 3/13/2023  EXAMINATION: CT OF THE ABDOMEN AND PELVIS WITH CONTRAST 3/13/2023 2:50 am TECHNIQUE: CT of the abdomen and pelvis was performed with the administration of intravenous contrast. Multiplanar reformatted images are provided for review. Automated exposure control, iterative reconstruction, and/or weight based adjustment of the mA/kV was utilized to reduce the radiation dose to as low as reasonably achievable. COMPARISON: 02/18/2023 HISTORY: ORDERING SYSTEM PROVIDED HISTORY: abdominal pain, vomiting, hernia TECHNOLOGIST PROVIDED HISTORY: Reason for exam:->abdominal pain, vomiting, hernia Additional Contrast?->None Decision Support Exception - unselect if not a suspected or confirmed emergency medical condition->Emergency Medical Condition (MA) What reading provider will be dictating this exam?->CRC FINDINGS: Lower Chest: No acute process in the lung bases. There are coronary artery calcifications. Organs: The liver, spleen, pancreas, kidneys and adrenal glands are without acute findings. The liver is diffusely hypoattenuating consistent with hepatic steatosis. The gallbladder is present without radiopaque cholelithiasis. GI/Bowel: Small hiatal hernia. No mechanical bowel obstruction. Normal appendix. Mild colonic mucosal edema. Pelvis: The urinary bladder is partially distended without contour abnormality.   Prostate and seminal vesicles are without acute findings. Small fat containing bilateral inguinal hernias. Peritoneum/Retroperitoneum: No ascites or pneumoperitoneum. Mild atherosclerotic plaque. No retroperitoneal or mesenteric lymphadenopathy. Bones/Soft Tissues: Small fat containing umbilical hernia. No acute bony abnormality. 1. Colonic mucosal edema suggestive of acute colitis. 2. Hepatic steatosis. XR CHEST PORTABLE    Result Date: 3/13/2023  EXAMINATION: ONE XRAY VIEW OF THE CHEST 3/13/2023 1:36 am COMPARISON: 02/18/2022 HISTORY: ORDERING SYSTEM PROVIDED HISTORY: vomiting TECHNOLOGIST PROVIDED HISTORY: Reason for exam:->vomiting What reading provider will be dictating this exam?->CRC FINDINGS: The lungs are without acute focal process. There is no effusion or pneumothorax. The cardiomediastinal silhouette is without acute process. The osseous structures are without acute process. No acute process. Assessment//Plan           Hospital Problems             Last Modified POA    * (Principal) Generalized abdominal pain 3/13/2023 Yes    Gastrointestinal hemorrhage 3/13/2023 Yes    Colitis 3/13/2023 Yes    Rectal mass 3/13/2023 Yes    Epigastric hernia 8/38/9222 Yes    Umbilical hernia without obstruction and without gangrene 3/13/2023 Yes     Colitis on CT scan  GI bleed  Mass palpated on rectal exam    Recommend EGD/colonoscopy. The patient was explained the risks/benefits/alternatives/expected outcomes of the procedure. The patient was explained the risks of the procedure, including, but not limited to, the risk of reaction to the anesthesia medicine and the risk of perforation requiring further surgery. The patient was informed that they may require biopsy or polypectomy. These procedures may increase the risk of complication. All questions were answered. The patient verbalized understanding and agreed to proceed.     Will prep today for scopes tomorrow    Ozzy Sepulveda MD, FACS  3/14/2023  4:36 PM

## 2023-03-14 NOTE — ACP (ADVANCE CARE PLANNING)
Advance Care Planning   Healthcare Decision Maker:    Primary Decision Maker: Becky Fuentes [facility] - [phone]    Click here to complete Healthcare Decision Makers including selection of the Healthcare Decision Maker Relationship (ie \"Primary\"). Today we documented Decision Maker(s) consistent with Legal Next of Kin hierarchy.        If the relationship to the patient does NOT follow our state's Next of Kin hierarchy, the patient MUST complete an ACP Document to allow him/her to act on the patient's behalf. :

## 2023-03-15 ENCOUNTER — ANESTHESIA EVENT (OUTPATIENT)
Dept: ENDOSCOPY | Age: 53
End: 2023-03-15
Payer: MEDICAID

## 2023-03-15 ENCOUNTER — ANESTHESIA (OUTPATIENT)
Dept: ENDOSCOPY | Age: 53
End: 2023-03-15
Payer: MEDICAID

## 2023-03-15 LAB
ANION GAP SERPL CALCULATED.3IONS-SCNC: 15 MMOL/L (ref 7–16)
BASOPHILS # BLD: 0.03 E9/L (ref 0–0.2)
BASOPHILS NFR BLD: 0.4 % (ref 0–2)
BUN SERPL-MCNC: 2 MG/DL (ref 6–20)
CALCIUM SERPL-MCNC: 8 MG/DL (ref 8.6–10.2)
CHLORIDE SERPL-SCNC: 98 MMOL/L (ref 98–107)
CO2 SERPL-SCNC: 22 MMOL/L (ref 22–29)
CREAT SERPL-MCNC: 0.5 MG/DL (ref 0.7–1.2)
EOSINOPHIL # BLD: 0.08 E9/L (ref 0.05–0.5)
EOSINOPHIL NFR BLD: 1 % (ref 0–6)
ERYTHROCYTE [DISTWIDTH] IN BLOOD BY AUTOMATED COUNT: 15.9 FL (ref 11.5–15)
GLUCOSE SERPL-MCNC: 98 MG/DL (ref 74–99)
HCT VFR BLD AUTO: 33.2 % (ref 37–54)
HGB BLD-MCNC: 11.6 G/DL (ref 12.5–16.5)
IMM GRANULOCYTES # BLD: 0.09 E9/L
IMM GRANULOCYTES NFR BLD: 1.1 % (ref 0–5)
LYMPHOCYTES # BLD: 1.87 E9/L (ref 1.5–4)
LYMPHOCYTES NFR BLD: 23.6 % (ref 20–42)
MAGNESIUM SERPL-MCNC: 1.9 MG/DL (ref 1.6–2.6)
MCH RBC QN AUTO: 33.6 PG (ref 26–35)
MCHC RBC AUTO-ENTMCNC: 34.9 % (ref 32–34.5)
MCV RBC AUTO: 96.2 FL (ref 80–99.9)
MONOCYTES # BLD: 0.46 E9/L (ref 0.1–0.95)
MONOCYTES NFR BLD: 5.8 % (ref 2–12)
NEUTROPHILS # BLD: 5.39 E9/L (ref 1.8–7.3)
NEUTS SEG NFR BLD: 68.1 % (ref 43–80)
PLATELET # BLD AUTO: 154 E9/L (ref 130–450)
PMV BLD AUTO: 10.8 FL (ref 7–12)
POTASSIUM SERPL-SCNC: 2.9 MMOL/L (ref 3.5–5)
RBC # BLD AUTO: 3.45 E12/L (ref 3.8–5.8)
SODIUM SERPL-SCNC: 135 MMOL/L (ref 132–146)
WBC # BLD: 7.9 E9/L (ref 4.5–11.5)

## 2023-03-15 PROCEDURE — 0DB68ZX EXCISION OF STOMACH, VIA NATURAL OR ARTIFICIAL OPENING ENDOSCOPIC, DIAGNOSTIC: ICD-10-PCS | Performed by: SURGERY

## 2023-03-15 PROCEDURE — 2580000003 HC RX 258: Performed by: STUDENT IN AN ORGANIZED HEALTH CARE EDUCATION/TRAINING PROGRAM

## 2023-03-15 PROCEDURE — C9113 INJ PANTOPRAZOLE SODIUM, VIA: HCPCS | Performed by: INTERNAL MEDICINE

## 2023-03-15 PROCEDURE — 0DBP8ZZ EXCISION OF RECTUM, VIA NATURAL OR ARTIFICIAL OPENING ENDOSCOPIC: ICD-10-PCS | Performed by: SURGERY

## 2023-03-15 PROCEDURE — 3700000001 HC ADD 15 MINUTES (ANESTHESIA): Performed by: SURGERY

## 2023-03-15 PROCEDURE — 3700000000 HC ANESTHESIA ATTENDED CARE: Performed by: SURGERY

## 2023-03-15 PROCEDURE — 80048 BASIC METABOLIC PNL TOTAL CA: CPT

## 2023-03-15 PROCEDURE — 2500000003 HC RX 250 WO HCPCS: Performed by: INTERNAL MEDICINE

## 2023-03-15 PROCEDURE — 88305 TISSUE EXAM BY PATHOLOGIST: CPT

## 2023-03-15 PROCEDURE — 0DB98ZX EXCISION OF DUODENUM, VIA NATURAL OR ARTIFICIAL OPENING ENDOSCOPIC, DIAGNOSTIC: ICD-10-PCS | Performed by: SURGERY

## 2023-03-15 PROCEDURE — 85025 COMPLETE CBC W/AUTO DIFF WBC: CPT

## 2023-03-15 PROCEDURE — 3609012400 HC EGD TRANSORAL BIOPSY SINGLE/MULTIPLE: Performed by: SURGERY

## 2023-03-15 PROCEDURE — 0DBH8ZZ EXCISION OF CECUM, VIA NATURAL OR ARTIFICIAL OPENING ENDOSCOPIC: ICD-10-PCS | Performed by: SURGERY

## 2023-03-15 PROCEDURE — 43239 EGD BIOPSY SINGLE/MULTIPLE: CPT | Performed by: SURGERY

## 2023-03-15 PROCEDURE — 7100000001 HC PACU RECOVERY - ADDTL 15 MIN: Performed by: SURGERY

## 2023-03-15 PROCEDURE — 2709999900 HC NON-CHARGEABLE SUPPLY: Performed by: SURGERY

## 2023-03-15 PROCEDURE — 6360000002 HC RX W HCPCS: Performed by: NURSE ANESTHETIST, CERTIFIED REGISTERED

## 2023-03-15 PROCEDURE — 45385 COLONOSCOPY W/LESION REMOVAL: CPT | Performed by: SURGERY

## 2023-03-15 PROCEDURE — 2060000000 HC ICU INTERMEDIATE R&B

## 2023-03-15 PROCEDURE — 36415 COLL VENOUS BLD VENIPUNCTURE: CPT

## 2023-03-15 PROCEDURE — 83735 ASSAY OF MAGNESIUM: CPT

## 2023-03-15 PROCEDURE — 2580000003 HC RX 258: Performed by: EMERGENCY MEDICINE

## 2023-03-15 PROCEDURE — 6370000000 HC RX 637 (ALT 250 FOR IP): Performed by: INTERNAL MEDICINE

## 2023-03-15 PROCEDURE — 2580000003 HC RX 258: Performed by: NURSE ANESTHETIST, CERTIFIED REGISTERED

## 2023-03-15 PROCEDURE — 7100000000 HC PACU RECOVERY - FIRST 15 MIN: Performed by: SURGERY

## 2023-03-15 PROCEDURE — 6360000002 HC RX W HCPCS: Performed by: INTERNAL MEDICINE

## 2023-03-15 PROCEDURE — 2580000003 HC RX 258: Performed by: INTERNAL MEDICINE

## 2023-03-15 PROCEDURE — 6360000002 HC RX W HCPCS: Performed by: STUDENT IN AN ORGANIZED HEALTH CARE EDUCATION/TRAINING PROGRAM

## 2023-03-15 PROCEDURE — 3609010400 HC COLONOSCOPY POLYPECTOMY HOT BIOPSY: Performed by: SURGERY

## 2023-03-15 PROCEDURE — 6360000002 HC RX W HCPCS: Performed by: FAMILY MEDICINE

## 2023-03-15 PROCEDURE — 45380 COLONOSCOPY AND BIOPSY: CPT | Performed by: SURGERY

## 2023-03-15 PROCEDURE — A4216 STERILE WATER/SALINE, 10 ML: HCPCS | Performed by: INTERNAL MEDICINE

## 2023-03-15 RX ORDER — PROPOFOL 10 MG/ML
INJECTION, EMULSION INTRAVENOUS PRN
Status: DISCONTINUED | OUTPATIENT
Start: 2023-03-15 | End: 2023-03-15 | Stop reason: SDUPTHER

## 2023-03-15 RX ORDER — POTASSIUM CHLORIDE 7.45 MG/ML
10 INJECTION INTRAVENOUS
Status: COMPLETED | OUTPATIENT
Start: 2023-03-15 | End: 2023-03-15

## 2023-03-15 RX ORDER — LIDOCAINE HYDROCHLORIDE 20 MG/ML
INJECTION, SOLUTION INTRAVENOUS PRN
Status: DISCONTINUED | OUTPATIENT
Start: 2023-03-15 | End: 2023-03-15 | Stop reason: SDUPTHER

## 2023-03-15 RX ORDER — MIDAZOLAM HYDROCHLORIDE 1 MG/ML
INJECTION INTRAMUSCULAR; INTRAVENOUS PRN
Status: DISCONTINUED | OUTPATIENT
Start: 2023-03-15 | End: 2023-03-15 | Stop reason: SDUPTHER

## 2023-03-15 RX ORDER — POTASSIUM CHLORIDE 7.45 MG/ML
10 INJECTION INTRAVENOUS
Status: DISPENSED | OUTPATIENT
Start: 2023-03-15 | End: 2023-03-15

## 2023-03-15 RX ORDER — SODIUM CHLORIDE 9 MG/ML
INJECTION, SOLUTION INTRAVENOUS CONTINUOUS PRN
Status: DISCONTINUED | OUTPATIENT
Start: 2023-03-15 | End: 2023-03-15 | Stop reason: SDUPTHER

## 2023-03-15 RX ADMIN — CEFTRIAXONE SODIUM 2000 MG: 2 INJECTION, POWDER, FOR SOLUTION INTRAMUSCULAR; INTRAVENOUS at 06:57

## 2023-03-15 RX ADMIN — SODIUM CHLORIDE: 9 INJECTION, SOLUTION INTRAVENOUS at 12:24

## 2023-03-15 RX ADMIN — POTASSIUM CHLORIDE 10 MEQ: 7.46 INJECTION, SOLUTION INTRAVENOUS at 16:41

## 2023-03-15 RX ADMIN — MIDAZOLAM 2 MG: 1 INJECTION INTRAMUSCULAR; INTRAVENOUS at 12:24

## 2023-03-15 RX ADMIN — LIDOCAINE HYDROCHLORIDE 100 MG: 20 INJECTION, SOLUTION INTRAVENOUS at 12:24

## 2023-03-15 RX ADMIN — POTASSIUM CHLORIDE 10 MEQ: 7.46 INJECTION, SOLUTION INTRAVENOUS at 09:58

## 2023-03-15 RX ADMIN — METRONIDAZOLE 500 MG: 500 INJECTION, SOLUTION INTRAVENOUS at 15:15

## 2023-03-15 RX ADMIN — SODIUM CHLORIDE 40 MG: 9 INJECTION, SOLUTION INTRAMUSCULAR; INTRAVENOUS; SUBCUTANEOUS at 08:54

## 2023-03-15 RX ADMIN — METRONIDAZOLE 500 MG: 500 INJECTION, SOLUTION INTRAVENOUS at 05:04

## 2023-03-15 RX ADMIN — SODIUM CHLORIDE, PRESERVATIVE FREE 10 ML: 5 INJECTION INTRAVENOUS at 22:50

## 2023-03-15 RX ADMIN — POTASSIUM CHLORIDE AND SODIUM CHLORIDE: 900; 150 INJECTION, SOLUTION INTRAVENOUS at 17:49

## 2023-03-15 RX ADMIN — PROPOFOL 700 MG: 10 INJECTION, EMULSION INTRAVENOUS at 12:27

## 2023-03-15 RX ADMIN — SODIUM CHLORIDE, PRESERVATIVE FREE 30 ML: 5 INJECTION INTRAVENOUS at 08:59

## 2023-03-15 RX ADMIN — POTASSIUM CHLORIDE 10 MEQ: 7.46 INJECTION, SOLUTION INTRAVENOUS at 15:05

## 2023-03-15 RX ADMIN — Medication 100 MG: at 08:54

## 2023-03-15 RX ADMIN — POTASSIUM CHLORIDE 10 MEQ: 7.46 INJECTION, SOLUTION INTRAVENOUS at 17:49

## 2023-03-15 RX ADMIN — METRONIDAZOLE 500 MG: 500 INJECTION, SOLUTION INTRAVENOUS at 22:50

## 2023-03-15 ASSESSMENT — PAIN SCALES - GENERAL
PAINLEVEL_OUTOF10: 0
PAINLEVEL_OUTOF10: 0

## 2023-03-15 NOTE — PLAN OF CARE
Problem: Discharge Planning  Goal: Discharge to home or other facility with appropriate resources  Recent Flowsheet Documentation  Taken 3/15/2023 0730 by Shahrzad Woody RN  Discharge to home or other facility with appropriate resources:   Identify barriers to discharge with patient and caregiver   Arrange for needed discharge resources and transportation as appropriate  3/15/2023 0511 by Mary Ellen Lofton RN  Outcome: Progressing     Problem: Pain  Goal: Verbalizes/displays adequate comfort level or baseline comfort level  3/15/2023 0511 by Mary Ellen Lofton RN  Outcome: Progressing     Problem: Safety - Adult  Goal: Free from fall injury  3/15/2023 1033 by Shahrzad Woody RN  Outcome: Progressing  Flowsheets (Taken 3/15/2023 0730)  Free From Fall Injury: Instruct family/caregiver on patient safety  3/15/2023 0511 by Mary Ellen Lofton RN  Outcome: Progressing     Problem: Risk for Elopement  Goal: Patient will not exit the unit/facility without proper excort  Recent Flowsheet Documentation  Taken 3/15/2023 0730 by Shahrzad Woody RN  Nursing Interventions for Elopement Risk:   Assist with personal care needs such as toileting, eating, dressing, as needed to reduce the risk of wandering   Collaborate with family members/caregivers to mitigate the elopement risk  3/15/2023 0511 by Mary Ellen Lofton RN  Outcome: Progressing  Flowsheets (Taken 3/14/2023 2000)  Nursing Interventions for Elopement Risk:   Collaborate with family members/caregivers to mitigate the elopement risk   Assist with personal care needs such as toileting, eating, dressing, as needed to reduce the risk of wandering   Collaborate with treatment team for drug withdrawal symptoms treatment   Collaborate with treatment team for nicotine replacement   Communicate/escalate to charge nurse the risk of elopement   Communicate/escalate to /other team member the risk of elopement   Communicate/escalate to nursing supervisor the risk of elopement Escort with two staff members if patient must leave the unit   Make sure patient has all necessary personal care items   Communicate to physician the risk for elopement   Place patient in room far away from exits and stairways   Shoes and clothing collected and placed in gown attire   Reduce environmental triggers

## 2023-03-15 NOTE — OP NOTE
Operative Note      Patient: Yamila Gray  YOB: 1970  MRN: [id number]    Date of Procedure: 3/15/2023    Pre-Op Diagnosis: GI bleed    Post-Op Diagnosis: Same and gastritis; large hiatal hernia; duodenitis/duodenal polyps; 1.5 cm colon polyp at 60 cm; two 3 mm polyps at 15 cm; rectal mass       Procedure(s):  EGD BIOPSY  COLONOSCOPY POLYPECTOMY HOT BIOPSY    Surgeon(s):  Amador Zelaya MD    Assistant:   * No surgical staff found *    Anesthesia: Monitor Anesthesia Care    Estimated Blood Loss (mL): Minimal    Complications: None    Specimens:   ID Type Source Tests Collected by Time Destination   A : Antral BX Tissue Tissue SURGICAL PATHOLOGY Amador Zelaya MD 3/15/2023 1229    B : Duodenal BX Tissue Tissue SURGICAL PATHOLOGY Amador Zelaya MD 3/15/2023 1228    C : Colon polyp at 60cm Tissue Tissue SURGICAL PATHOLOGY Amador Zelaya MD 3/15/2023 1324    D : Colon polyp at 15cm x2 Tissue Tissue SURGICAL PATHOLOGY Amador Zelaya MD 3/15/2023 1326    E : Rectal mass Tissue Tissue SURGICAL PATHOLOGY Amador Zelaya MD 3/15/2023 1331        Implants:  * No implants in log *      Drains: * No LDAs found *    Findings: gastritis; hiatal hernia; duodenitis/duodenal polyps; colon polyps/rectal mass    Detailed Description of Procedure:   ESOPHAGOGASTRODUODENOSCOPY PROCEDURE NOTE  PROCEDURE:  The patient was brought into the endoscopy suite and placed in the left lateral decubitus position. A bite block was placed in the patients mouth. After the initiation of LMAC anesthesia, a gastroscope was inserted into the patient's mouth and passed into the esophagus and into the stomach. Immediately upon entering the stomach the note of gastritis was made. The scope was advanced through the pylorus into the first and second portion of the duodenum making the note of duodenal polyps/duodenitis--biopsies were taken. The scope was then withdrawn back into the stomach where it was retroflexed.   There was a large hiatal hernia noted.  The scope was then straightened and antral biopsies were taken. The scope was then withdrawn the entire length of the esophagus, making the note of a normal esophagus without masses, ulcerations, or lesions noted. The scope was withdrawn entirely. The patient tolerated the procedure well and there were no complications. COLONOSCOPY PROCEDURE NOTE  PROCEDURE:  The patient was brought into the endoscopy suite and placed in the left lateral decubitus position. A digital rectal exam was performed after the initiation of LMAC anesthesia and failed to reveal any obstructing masses or lesions. A colonoscope was inserted into the patient's anus and passed through the rectum, sigmoid, descending, transverse, and ascending colon all the way to the level of the cecum. Visualization of the cecum was confirmed by visualization of the ileo-cecal valve and confluence of the tinea. The scope was then withdrawn the entire length of the colon. There were no masses, polyps, or lesions noted until reaching 60 cm where a 1.5 cm colon polyp/mass was seen and removed with snare connected to electrocautery. At 15 cm two 3 mm polyps were seen and biopsies. In the rectum a large rectal mass was partially removed with snare connected to electrocautery. The patient tolerated the procedure well and there were no complications. Prep was marginal; repeat colonoscopy in pending pathology.           Electronically signed by Amber Cartwright MD on 3/15/2023 at 1:46 PM

## 2023-03-15 NOTE — PROGRESS NOTES
CC: colitis; lower GI bleed    S: Clear from below       Objective         Vitals Last 24 Hours:  TEMPERATURE:  Temp  Av.3 °F (36.8 °C)  Min: 98.1 °F (36.7 °C)  Max: 98.4 °F (36.9 °C)  RESPIRATIONS RANGE: Resp  Av.7  Min: 18  Max: 20  PULSE OXIMETRY RANGE: SpO2  Av.3 %  Min: 96 %  Max: 100 %  PULSE RANGE: Pulse  Av  Min: 80  Max: 100  BLOOD PRESSURE RANGE: Systolic (30EEJ), GTM:824 , Min:136 , FNV:403   ; Diastolic (33VQJ), LQV:185, Min:91, Max:118    I/O (24Hr): Intake/Output Summary (Last 24 hours) at 3/15/2023 0650  Last data filed at 3/14/2023 1407  Gross per 24 hour   Intake 680 ml   Output --   Net 680 ml       Objective:  General Appearance:  Comfortable, in no acute distress and ill-appearing. Vital signs: (most recent): Blood pressure (!) 140/114, pulse 84, temperature 98.4 °F (36.9 °C), temperature source Oral, resp. rate 18, height 6' (1.829 m), weight 210 lb (95.3 kg), SpO2 100 %. Lungs:  Normal effort and normal respiratory rate. Breath sounds clear to auscultation. He is not in respiratory distress. Heart: Normal rate. Regular rhythm. No murmur. Abdomen: Abdomen is soft and non-distended. Bowel sounds are normal.   There is no abdominal tenderness. Neurological: Patient is alert and oriented to person, place and time. Skin:  Warm and dry. Labs/Imaging/Diagnostics    Labs:  personally reviewed  CBC:  Recent Labs     23  01023   WBC 17.7* 8.9   RBC 4.11 3.20*   HGB 13.9 10.8*   HCT 38.2 31.0*   MCV 92.9 96.9   RDW 15.2* 15.8*    150       CHEMISTRIES:  Recent Labs     23  0105 23  0533 23  0442 23  1508    136 138  --    K 3.4* 3.2* 2.8* 3.3*   CL 96* 101 101  --    CO2 15* 22 24  --    BUN 7 7 4*  --    CREATININE 0.6* 0.5* 0.5*  --    GLUCOSE 183* 143* 105*  --    MG  --   --  1.6  --        PT/INR:No results for input(s): PROTIME, INR in the last 72 hours. APTT:No results for input(s):  APTT in the last 72 hours.   LIVER PROFILE:  Recent Labs     03/13/23  0105   AST 51*   ALT 25   BILIDIR 0.3   BILITOT 1.3*   ALKPHOS 186*         Imaging Last 24 Hours:  No new imaging     Assessment//Plan           Hospital Problems             Last Modified POA    * (Principal) Generalized abdominal pain 3/13/2023 Yes    Gastrointestinal hemorrhage 3/13/2023 Yes    Colitis 3/13/2023 Yes    Rectal mass 3/13/2023 Yes    Epigastric hernia 3/26/0575 Yes    Umbilical hernia without obstruction and without gangrene 3/13/2023 Yes       EGD/ colonoscopy today     Electronically signed by Arleen Puga MD on 3/15/2023 at 6:58 AM

## 2023-03-15 NOTE — CARE COORDINATION
Cm note. Pt remains on PICU. Gen surgery following. Is for EGD and colonoscopy today. Met with pt. He is up independently in the room. Plan is home when medically stable. No home going needs identified at this time. Diogo Pollock RN [facility] [phone].

## 2023-03-15 NOTE — ANESTHESIA PRE PROCEDURE
Department of Anesthesiology  Preprocedure Note       Name:  Fernie Damon   Age:  47 y.o.  :  1970                                          MRN:  [id number]         Date:  3/15/2023      Surgeon: Fermin Orozco):  Ryan Marr MD    Procedure: Procedure(s):  EGD ESOPHAGOGASTRODUODENOSCOPY  COLONOSCOPY DIAGNOSTIC    Medications prior to admission:   Prior to Admission medications    Not on File       Current medications:    Current Facility-Administered Medications   Medication Dose Route Frequency Provider Last Rate Last Admin    potassium chloride 10 mEq/100 mL IVPB (Peripheral Line)  10 mEq IntraVENous Q1H Brandi Latif  mL/hr at 03/15/23 0958 10 mEq at 03/15/23 0958    cefTRIAXone (ROCEPHIN) 2,000 mg in sterile water 20 mL IV syringe  2,000 mg IntraVENous Q24H Marc Gee MD   2,000 mg at 03/15/23 0657    0.9% NaCl with KCl 20 mEq infusion   IntraVENous Continuous Brandi Latif  mL/hr at 23 1748 New Bag at /32/55 7852    folic acid injection 1 mg  1 mg IntraVENous Once Julian Lupis, DO        sodium chloride flush 0.9 % injection 5-40 mL  5-40 mL IntraVENous 2 times per day Julian Lupis, DO   30 mL at 03/15/23 0859    sodium chloride flush 0.9 % injection 5-40 mL  5-40 mL IntraVENous PRN Julian Lupis, DO        0.9 % sodium chloride infusion   IntraVENous PRN Julian Lupis,  mL/hr at 23 0748 New Bag at 23 0748    LORazepam (ATIVAN) tablet 1 mg  1 mg Oral Q1H PRN Julian Lupis, DO   1 mg at 23 1304    Or    LORazepam (ATIVAN) injection 1 mg  1 mg IntraVENous Q1H PRN Julian Lupis, DO        Or    LORazepam (ATIVAN) tablet 2 mg  2 mg Oral Q1H PRN Julian Lupis, DO        Or    LORazepam (ATIVAN) injection 2 mg  2 mg IntraVENous Q1H PRN Julian Lupis, DO        Or    LORazepam (ATIVAN) tablet 3 mg  3 mg Oral Q1H PRN Julian Lupis, DO        Or    LORazepam (ATIVAN) injection 3 mg  3 mg IntraVENous Q1H PRN Julian Lupis, DO Or    LORazepam (ATIVAN) tablet 4 mg  4 mg Oral Q1H PRN Tino Hernandez DO        Or    LORazepam (ATIVAN) injection 4 mg  4 mg IntraVENous Q1H PRN Tino Hernandez DO        sodium chloride flush 0.9 % injection 5-40 mL  5-40 mL IntraVENous 2 times per day Josiah Schrader MD   30 mL at 03/15/23 0859    sodium chloride flush 0.9 % injection 5-40 mL  5-40 mL IntraVENous PRN Troy Vasquez MD        0.9 % sodium chloride infusion   IntraVENous PRN Troy Vasquez MD        ondansetron (ZOFRAN-ODT) disintegrating tablet 4 mg  4 mg Oral Q8H PRN Troy Vasquez MD        Or    ondansetron (ZOFRAN) injection 4 mg  4 mg IntraVENous Q6H PRN Troy Vasquez MD        polyethylene glycol (GLYCOLAX) packet 17 g  17 g Oral Daily PRN Troy Vasquez MD        acetaminophen (TYLENOL) tablet 650 mg  650 mg Oral Q6H PRN Troy Vasquez MD        Or    acetaminophen (TYLENOL) suppository 650 mg  650 mg Rectal Q6H PRN Troy Vasquez MD        metronidazole (FLAGYL) 500 mg in 0.9% NaCl 100 mL IVPB premix  500 mg IntraVENous Q8H Josiah Schrader MD   Stopped at 03/15/23 0648    morphine (PF) injection 2 mg  2 mg IntraVENous Q4H PRN Troy Vasquez MD        potassium chloride (KLOR-CON M) extended release tablet 40 mEq  40 mEq Oral Once Troy Vasquez MD        enoxaparin (LOVENOX) injection 40 mg  40 mg SubCUTAneous Daily Troy Vasquez MD   40 mg at 03/14/23 0808    thiamine tablet 100 mg  100 mg Oral Daily Troy Vasquez MD   100 mg at 03/15/23 0854    pantoprazole (PROTONIX) 40 mg in sodium chloride (PF) 0.9 % 10 mL injection  40 mg IntraVENous Daily Troy Vasquez MD   40 mg at 03/15/23 0854    hydrALAZINE (APRESOLINE) injection 10 mg  10 mg IntraVENous Q3H PRN Becky Buerger, DO   10 mg at 03/13/23 2040    hydrALAZINE (APRESOLINE) injection 20 mg  20 mg IntraVENous Q3H PRN Becky Buerger, DO   20 mg at 03/14/23 0179       Allergies:  No Known Allergies    Problem List:    Patient Active Problem List   Diagnosis Code    Crushing injury of left index finger S67.191A    Alcohol withdrawal with perceptual disturbances ([facility]) F10.932    Acute psychosis ([facility] [address] F23    Generalized abdominal pain R10.84    Gastrointestinal hemorrhage K92.2    Colitis K52.9    Rectal mass K62.89    Epigastric hernia O76.4    Umbilical hernia without obstruction and without gangrene K42.9       Past Medical History:        Diagnosis Date    Hernia        Past Surgical History:        Procedure Laterality Date    MANDIBLE FRACTURE SURGERY         Social History:    Social History     Tobacco Use    Smoking status: Every Day     Packs/day: 1.00     Years: 27.00     Pack years: 27.00     Types: Cigarettes    Smokeless tobacco: Current   Substance Use Topics    Alcohol use: Yes     Comment: 6 four locos daily                                Ready to quit: Not Answered  Counseling given: Not Answered      Vital Signs (Current):   Vitals:    03/14/23 0800 03/14/23 1236 03/14/23 1945 03/15/23 0730   BP: (!) 153/118 (!) 136/91 (!) 140/114 (!) 158/109   Pulse: 80 100 84 87   Resp: 18 20 18 20   Temp: 36.8 °C (98.3 °F) 36.7 °C (98.1 °F) 36.9 °C (98.4 °F) 36.8 °C (98.3 °F)   TempSrc: Oral  Oral Oral   SpO2: 96% 96% 100% 98%   Weight:       Height:                                                  BP Readings from Last 3 Encounters:   03/15/23 (!) 158/109   02/18/23 (!) 138/102   03/21/22 (!) 160/100       NPO Status:                                                                                 BMI:   Wt Readings from Last 3 Encounters:   03/13/23 210 lb (95.3 kg)   02/18/23 215 lb (97.5 kg)   03/21/22 205 lb (93 kg)     Body mass index is 28.48 kg/m².     CBC:   Lab Results   Component Value Date/Time    WBC 7.9 03/15/2023 07:41 AM    RBC 3.45 03/15/2023 07:41 AM    HGB 11.6 03/15/2023 07:41 AM    HCT 33.2 03/15/2023 07:41 AM    MCV 96.2 03/15/2023 07:41 AM    RDW 15.9 03/15/2023 07:41 AM     03/15/2023 07:41 AM       CMP:   Lab Results   Component Value Date/Time     03/15/2023 07:41 AM    K 2.9 03/15/2023 07:41 AM    CL 98 03/15/2023 07:41 AM    CO2 22 03/15/2023 07:41 AM    BUN 2 03/15/2023 07:41 AM    CREATININE 0.5 03/15/2023 07:41 AM    GFRAA >60 09/19/2019 09:22 PM    LABGLOM >60 03/15/2023 07:41 AM    GLUCOSE 98 03/15/2023 07:41 AM    GLUCOSE 91 04/07/2012 11:19 PM    PROT 6.7 03/13/2023 01:05 AM    CALCIUM 8.0 03/15/2023 07:41 AM    BILITOT 1.3 03/13/2023 01:05 AM    ALKPHOS 186 03/13/2023 01:05 AM    AST 51 03/13/2023 01:05 AM    ALT 25 03/13/2023 01:05 AM       POC Tests: No results for input(s): POCGLU, POCNA, POCK, POCCL, POCBUN, POCHEMO, POCHCT in the last 72 hours.    Coags:   Lab Results   Component Value Date/Time    PROTIME 11.9 02/18/2023 02:04 PM    INR 1.1 02/18/2023 02:04 PM    APTT 33.0 02/18/2023 02:04 PM       HCG (If Applicable): No results found for: PREGTESTUR, PREGSERUM, HCG, HCGQUANT     ABGs: No results found for: PHART, PO2ART, OOA9OWD, FJH0YJM, BEART, P4ZCVNFU     Type & Screen (If Applicable):  No results found for: LABABO, LABRH    Drug/Infectious Status (If Applicable):  No results found for: HIV, HEPCAB    COVID-19 Screening (If Applicable): No results found for: COVID19        Anesthesia Evaluation  Patient summary reviewed and Nursing notes reviewed no history of anesthetic complications:   Airway: Mallampati: III     Neck ROM: full  Mouth opening: > = 3 FB   Dental:    (+) poor dentition      Pulmonary:Negative Pulmonary ROS breath sounds clear to auscultation                             Cardiovascular:Negative CV ROS            Rhythm: regular  Rate: normal                    Neuro/Psych:   (+) psychiatric history:            GI/Hepatic/Renal:   (+) bowel prep,          ROS comment: ETOH.   Endo/Other: Negative Endo/Other ROS                    Abdominal:       Abdomen: soft.      Vascular: negative vascular ROS.         Other Findings:           Anesthesia Plan      MAC  ASA 3             Anesthetic plan and risks discussed with patient.     Use of blood products discussed with patient whom.                     salazar Marie Nurse, APRN - CRNA   3/15/2023

## 2023-03-15 NOTE — PLAN OF CARE
Problem: Discharge Planning  Goal: Discharge to home or other facility with appropriate resources  Outcome: Progressing     Problem: Pain  Goal: Verbalizes/displays adequate comfort level or baseline comfort level  Outcome: Progressing     Problem: Safety - Adult  Goal: Free from fall injury  Outcome: Progressing     Problem: Risk for Elopement  Goal: Patient will not exit the unit/facility without proper excort  Outcome: Progressing  Flowsheets (Taken 3/14/2023 2000)  Nursing Interventions for Elopement Risk:   Collaborate with family members/caregivers to mitigate the elopement risk   Assist with personal care needs such as toileting, eating, dressing, as needed to reduce the risk of wandering   Collaborate with treatment team for drug withdrawal symptoms treatment   Collaborate with treatment team for nicotine replacement   Communicate/escalate to charge nurse the risk of elopement   Communicate/escalate to /other team member the risk of elopement   Communicate/escalate to nursing supervisor the risk of elopement   Escort with two staff members if patient must leave the unit   Make sure patient has all necessary personal care items   Communicate to physician the risk for elopement   Place patient in room far away from exits and stairways   Shoes and clothing collected and placed in gown attire   Reduce environmental triggers

## 2023-03-15 NOTE — PROGRESS NOTES
Hospitalist Progress Note      SYNOPSIS: Patient admitted on 3/13/2023 for Generalized abdominal pain      SUBJECTIVE:    Patient seen and examined. He did finish drink his prep. He denies any abdominal pain today. Records reviewed. 67-year-old male past medical history of alcohol abuse, ventral hernia, peptic ulcer disease presented to the hospital with intractable abdominal pain. Epigastric region he does drink 4-5 beers daily. Also has been having hematochezia and intractable nausea vomiting. CT abdomen pelvis that showed concern for colitis 6. General surgery is currently consulted at this no plan for endoscopic procedure recommend outpatient follow-up for EGD colonoscopy. Also has had electrolyte abnormalities with low potassium. Started on antibiotics. Also on alcohol withdrawal protocol. Plan for EGD/colonoscopy. Stable overnight. No other overnight issues reported. Temp (24hrs), Av.3 °F (36.8 °C), Min:98.1 °F (36.7 °C), Max:98.4 °F (36.9 °C)    DIET: Diet NPO Exceptions are: Sips of Water with Meds  CODE: Full Code    Intake/Output Summary (Last 24 hours) at 3/15/2023 0946  Last data filed at 3/14/2023 1407  Gross per 24 hour   Intake 680 ml   Output --   Net 680 ml       OBJECTIVE:    BP (!) 158/109   Pulse 87   Temp 98.3 °F (36.8 °C) (Oral)   Resp 20   Ht 6' (1.829 m)   Wt 210 lb (95.3 kg)   SpO2 98%   BMI 28.48 kg/m²     General appearance: No apparent distress, appears stated age and cooperative. HEENT:  Conjunctivae/corneas clear. Neck: Supple. No jugular venous distention. Respiratory: Clear to auscultation bilaterally, normal respiratory effort  Cardiovascular: Regular rate rhythm, normal S1-S2  Abdomen: Soft, nontender, nondistended  Musculoskeletal: No clubbing, cyanosis, no bilateral lower extremity edema. Brisk capillary refill.    Skin:  No rashes  on visible skin  Neurologic: awake, alert and following commands     ASSESSMENT:  Colitis  Hypokalemia  Alcohol abuse with withdrawal  Hematochezia        PLAN:  Potassium 2.9, replace via IV. Plan for EGD/Colonoscopy today per general surgery. Continue ceftriaxone and flagyl. DISPOSITION:     Medications:  REVIEWED DAILY    Infusion Medications    0.9% NaCl with KCl 20 mEq 100 mL/hr at 03/14/23 1748    sodium chloride 100 mL/hr at 03/13/23 0748    sodium chloride       Scheduled Medications    potassium chloride  10 mEq IntraVENous Q1H    cefTRIAXone (ROCEPHIN) IV  2,000 mg IntraVENous N10Q    folic acid  1 mg IntraVENous Once    sodium chloride flush  5-40 mL IntraVENous 2 times per day    sodium chloride flush  5-40 mL IntraVENous 2 times per day    metroNIDAZOLE  500 mg IntraVENous Q8H    potassium chloride  40 mEq Oral Once    enoxaparin  40 mg SubCUTAneous Daily    thiamine  100 mg Oral Daily    pantoprazole (PROTONIX) 40 mg injection  40 mg IntraVENous Daily     PRN Meds: sodium chloride flush, sodium chloride, LORazepam **OR** LORazepam **OR** LORazepam **OR** LORazepam **OR** LORazepam **OR** LORazepam **OR** LORazepam **OR** LORazepam, sodium chloride flush, sodium chloride, ondansetron **OR** ondansetron, polyethylene glycol, acetaminophen **OR** acetaminophen, morphine, hydrALAZINE, hydrALAZINE    Labs:     Recent Labs     03/13/23  0105 03/14/23  0442 03/15/23  0741   WBC 17.7* 8.9 7.9   HGB 13.9 10.8* 11.6*   HCT 38.2 31.0* 33.2*    150 154       Recent Labs     03/13/23  0533 03/14/23  0442 03/14/23  1508 03/15/23  0741    138  --  135   K 3.2* 2.8* 3.3* 2.9*    101  --  98   CO2 22 24  --  22   BUN 7 4*  --  2*   CREATININE 0.5* 0.5*  --  0.5*   CALCIUM 7.7* 7.7*  --  8.0*       Recent Labs     03/13/23  0105   PROT 6.7   ALKPHOS 186*   ALT 25   AST 51*   BILITOT 1.3*   LIPASE 63*       No results for input(s): INR in the last 72 hours. No results for input(s): Steve Ebbs in the last 72 hours.     Chronic labs:    Lab Results   Component Value Date    CHOL 224 (H) 07/03/2018 TRIG 94 07/03/2018    HDL 59 07/03/2018    LDLCALC 146 (H) 07/03/2018    TSH 1.730 07/01/2018    INR 1.1 02/18/2023    LABA1C 5.9 (H) 07/03/2018       Radiology: REVIEWED DAILY    +++++++++++++++++++++++++++++++++++++++++++++++++  Jeffery Castaneda MD  [facility] [place] Physician - Hospitalist  [place], [place]  +++++++++++++++++++++++++++++++++++++++++++++++++  NOTE: This report was transcribed using voice recognition software. Every effort was made to ensure accuracy; however, inadvertent computerized transcription errors may be present.

## 2023-03-16 ENCOUNTER — APPOINTMENT (OUTPATIENT)
Dept: CT IMAGING | Age: 53
DRG: 392 | End: 2023-03-16
Payer: MEDICAID

## 2023-03-16 VITALS
HEART RATE: 115 BPM | WEIGHT: 210 LBS | TEMPERATURE: 98.1 F | RESPIRATION RATE: 20 BRPM | SYSTOLIC BLOOD PRESSURE: 154 MMHG | BODY MASS INDEX: 28.44 KG/M2 | OXYGEN SATURATION: 98 % | DIASTOLIC BLOOD PRESSURE: 123 MMHG | HEIGHT: 72 IN

## 2023-03-16 LAB
ANION GAP SERPL CALCULATED.3IONS-SCNC: 16 MMOL/L (ref 7–16)
BASOPHILS # BLD: 0.04 E9/L (ref 0–0.2)
BASOPHILS NFR BLD: 0.4 % (ref 0–2)
BUN SERPL-MCNC: <2 MG/DL (ref 6–20)
CALCIUM SERPL-MCNC: 8.7 MG/DL (ref 8.6–10.2)
CEA SERPL-MCNC: 19.3 NG/ML (ref 0–5.2)
CHLORIDE SERPL-SCNC: 99 MMOL/L (ref 98–107)
CO2 SERPL-SCNC: 23 MMOL/L (ref 22–29)
CREAT SERPL-MCNC: 0.6 MG/DL (ref 0.7–1.2)
EOSINOPHIL # BLD: 0.11 E9/L (ref 0.05–0.5)
EOSINOPHIL NFR BLD: 1.2 % (ref 0–6)
ERYTHROCYTE [DISTWIDTH] IN BLOOD BY AUTOMATED COUNT: 16.4 FL (ref 11.5–15)
GLUCOSE SERPL-MCNC: 107 MG/DL (ref 74–99)
HCT VFR BLD AUTO: 36 % (ref 37–54)
HGB BLD-MCNC: 12.3 G/DL (ref 12.5–16.5)
IMM GRANULOCYTES # BLD: 0.19 E9/L
IMM GRANULOCYTES NFR BLD: 2.1 % (ref 0–5)
LYMPHOCYTES # BLD: 2.51 E9/L (ref 1.5–4)
LYMPHOCYTES NFR BLD: 27.4 % (ref 20–42)
MAGNESIUM SERPL-MCNC: 1.5 MG/DL (ref 1.6–2.6)
MCH RBC QN AUTO: 33.7 PG (ref 26–35)
MCHC RBC AUTO-ENTMCNC: 34.2 % (ref 32–34.5)
MCV RBC AUTO: 98.6 FL (ref 80–99.9)
MONOCYTES # BLD: 0.81 E9/L (ref 0.1–0.95)
MONOCYTES NFR BLD: 8.9 % (ref 2–12)
NEUTROPHILS # BLD: 5.49 E9/L (ref 1.8–7.3)
NEUTS SEG NFR BLD: 60 % (ref 43–80)
PLATELET # BLD AUTO: 150 E9/L (ref 130–450)
PMV BLD AUTO: 11.1 FL (ref 7–12)
POTASSIUM SERPL-SCNC: 2.8 MMOL/L (ref 3.5–5)
RBC # BLD AUTO: 3.65 E12/L (ref 3.8–5.8)
SODIUM SERPL-SCNC: 138 MMOL/L (ref 132–146)
WBC # BLD: 9.2 E9/L (ref 4.5–11.5)

## 2023-03-16 PROCEDURE — 71260 CT THORAX DX C+: CPT

## 2023-03-16 PROCEDURE — 99232 SBSQ HOSP IP/OBS MODERATE 35: CPT | Performed by: SURGERY

## 2023-03-16 PROCEDURE — 6370000000 HC RX 637 (ALT 250 FOR IP): Performed by: INTERNAL MEDICINE

## 2023-03-16 PROCEDURE — 2500000003 HC RX 250 WO HCPCS: Performed by: INTERNAL MEDICINE

## 2023-03-16 PROCEDURE — 85025 COMPLETE CBC W/AUTO DIFF WBC: CPT

## 2023-03-16 PROCEDURE — 83735 ASSAY OF MAGNESIUM: CPT

## 2023-03-16 PROCEDURE — 6360000004 HC RX CONTRAST MEDICATION: Performed by: RADIOLOGY

## 2023-03-16 PROCEDURE — 2580000003 HC RX 258: Performed by: STUDENT IN AN ORGANIZED HEALTH CARE EDUCATION/TRAINING PROGRAM

## 2023-03-16 PROCEDURE — 6370000000 HC RX 637 (ALT 250 FOR IP): Performed by: SURGERY

## 2023-03-16 PROCEDURE — 80048 BASIC METABOLIC PNL TOTAL CA: CPT

## 2023-03-16 PROCEDURE — 82378 CARCINOEMBRYONIC ANTIGEN: CPT

## 2023-03-16 PROCEDURE — 6360000002 HC RX W HCPCS: Performed by: STUDENT IN AN ORGANIZED HEALTH CARE EDUCATION/TRAINING PROGRAM

## 2023-03-16 PROCEDURE — 36415 COLL VENOUS BLD VENIPUNCTURE: CPT

## 2023-03-16 RX ORDER — HYDRALAZINE HYDROCHLORIDE 10 MG/1
20 TABLET, FILM COATED ORAL ONCE
Status: COMPLETED | OUTPATIENT
Start: 2023-03-16 | End: 2023-03-16

## 2023-03-16 RX ORDER — SODIUM CHLORIDE 0.9 % (FLUSH) 0.9 %
10 SYRINGE (ML) INJECTION
Status: DISCONTINUED | OUTPATIENT
Start: 2023-03-16 | End: 2023-03-16 | Stop reason: HOSPADM

## 2023-03-16 RX ORDER — POTASSIUM CHLORIDE 20 MEQ/1
40 TABLET, EXTENDED RELEASE ORAL ONCE
Status: DISCONTINUED | OUTPATIENT
Start: 2023-03-16 | End: 2023-03-16 | Stop reason: HOSPADM

## 2023-03-16 RX ADMIN — LORAZEPAM 2 MG: 1 TABLET ORAL at 08:33

## 2023-03-16 RX ADMIN — IOPAMIDOL 75 ML: 755 INJECTION, SOLUTION INTRAVENOUS at 09:14

## 2023-03-16 RX ADMIN — METRONIDAZOLE 500 MG: 500 INJECTION, SOLUTION INTRAVENOUS at 05:57

## 2023-03-16 RX ADMIN — CEFTRIAXONE SODIUM 2000 MG: 2 INJECTION, POWDER, FOR SOLUTION INTRAMUSCULAR; INTRAVENOUS at 05:51

## 2023-03-16 RX ADMIN — HYDRALAZINE HYDROCHLORIDE 20 MG: 10 TABLET, FILM COATED ORAL at 10:27

## 2023-03-16 NOTE — ANESTHESIA POSTPROCEDURE EVALUATION
Department of Anesthesiology  Postprocedure Note    Patient: Serafin Abreu  MRN: [id number]  YOB: 1970  Date of evaluation: 3/16/2023      Procedure Summary     Date: 03/15/23 Room / Location: [place] [id number] / [facility]    Anesthesia Start: 1224 Anesthesia Stop: 1355    Procedures:       EGD BIOPSY      COLONOSCOPY POLYPECTOMY HOT BIOPSY Diagnosis:       Colitis      (/)    Surgeons: Foster Trujillo MD Responsible Provider: Frank Gerber DO    Anesthesia Type: MAC ASA Status: 3          Anesthesia Type: No value filed.    Liang Phase I: Liang Score: 9    Liang Phase II:        Anesthesia Post Evaluation    Patient location during evaluation: bedside  Patient participation: complete - patient cannot participate  Level of consciousness: awake and alert  Airway patency: patent  Nausea & Vomiting: no nausea and no vomiting  Complications: no  Cardiovascular status: blood pressure returned to baseline  Respiratory status: acceptable  Hydration status: euvolemic  There was medical reason for not using a multimodal analgesia pain management approach.

## 2023-03-16 NOTE — PROGRESS NOTES
Patient is extremely agitated and restless this morning. Refused MRI, refused heart monitor, fluids and any type IV medication. I explained to him that his K+ is critically low. BP/HR is elevated, he scored 15 on CIWA scale and 2mg of ativan was given. He is visibly having tremors. Agreed to CT but stated \"I want to leave after the test.\"    Dr. Candy Miguel has been notified. Patient was not in his room when I went to recheck BP. He left and did not sign AMA paperwork.

## 2023-03-16 NOTE — DISCHARGE SUMMARY
Hospitalist Discharge Summary    Patient ID: Anshul García   Patient : 1970  Patient's PCP: No primary care provider on file. Admit Date: 3/13/2023   Admitting Physician: Sy Bowles DO    Discharge Date:  3/16/2023  Discharge Physician: Analisa Harvey MD   Discharge Condition:  Fair   Discharge Disposition: AMA    History of presenting illness:    Mr. Anshul García, a 47y.o. year old male patient with history of ventral hernia, alcohol abuse, peptic ulcer disease who comes into the hospital with complaints of intractable abdominal pain. His abdominal pain is mostly epigastric and it has progressively gotten worse since this past Thursday. He consumes around 4-5 beers on a daily basis. He also has been having intractable nausea, vomiting and also has had some hematochezia. Since her symptoms were not subsiding he came to the ER and CT abdomen was done which showed concern for colitis. Patient also was noted to have lactic acidosis and leukocytosis. Initially he also had significant abdominal tenderness. He continues to have some hematochezia which she describes as bright red bleeding. No hematemesis noted. Hospital course in brief:  (Please refer to daily progress notes for a comprehensive review of the hospitalization by requesting medical records)    Patient was admitted with intractable abdominal pain. Epigastric region he does drink 4-5 beers daily. Also has been having hematochezia and intractable nausea vomiting. CT abdomen pelvis that showed concern for colitis 6. General surgery is currently consulted at this no plan for endoscopic procedure recommend outpatient follow-up for EGD colonoscopy. Also has had electrolyte abnormalities with low potassium. Started on antibiotics. Also on alcohol withdrawal protocol. Plan for EGD/colonoscopy    Patient went colonoscopy and EGD.   Post-Op Diagnosis: Same and gastritis; large hiatal hernia; duodenitis/duodenal polyps; 1.5 cm colon polyp at 60 cm; two 3 mm polyps at 15 cm; rectal mass       Colitis he was treated with antibiotics with Rocephin and Flagyl. Patient had repeat abdominal CT. Pending results. Alcohol withdrawals fluctuated  Hypokalemia being replaced. Blood pressure fluctuated secondary to withdrawal.  Unfortunately patient signed AGAINST MEDICAL ADVICE    Consults:   IP CONSULT TO SOCIAL WORK  IP CONSULT TO INTERNAL MEDICINE  IP CONSULT TO GENERAL SURGERY  IP CONSULT TO SOCIAL WORK    Discharge Diagnoses:  Colitis  Hypokalemia  Alcohol abuse with withdrawal  Hematochezia  Suspected colon mass     Discharge Instructions / Follow up:    Continued appropriate risk factor modification of blood pressure, diabetes and serum lipids will remain essential to reducing risk of future atherosclerotic development    Activity: activity as tolerated    Significant labs:  CBC:   Recent Labs     03/14/23  0442 03/15/23  0741 03/16/23  0612   WBC 8.9 7.9 9.2   RBC 3.20* 3.45* 3.65*   HGB 10.8* 11.6* 12.3*   HCT 31.0* 33.2* 36.0*   MCV 96.9 96.2 98.6   RDW 15.8* 15.9* 16.4*    154 150     BMP:   Recent Labs     03/14/23  0442 03/14/23  1508 03/15/23  0741 03/16/23  0612     --  135 138   K 2.8* 3.3* 2.9* 2.8*     --  98 99   CO2 24  --  22 23   BUN 4*  --  2* <2*   CREATININE 0.5*  --  0.5* 0.6*   MG 1.6  --  1.9 1.5*     LFT:  No results for input(s): PROT, ALB, ALKPHOS, ALT, AST, BILITOT, AMYLASE, LIPASE in the last 72 hours. PT/INR: No results for input(s): INR, APTT in the last 72 hours. BNP: No results for input(s): BNP in the last 72 hours.   Hgb A1C:   Lab Results   Component Value Date    LABA1C 5.9 (H) 07/03/2018     Folate and B12: No results found for: Willim Manual, No results found for: FOLATE  Thyroid Studies:   Lab Results   Component Value Date    TSH 1.730 07/01/2018       Urinalysis:    Lab Results   Component Value Date/Time    NITRU Negative 03/13/2023 01:05 AM    WBCUA NONE 03/13/2023 01:05 AM    BACTERIA NONE SEEN 03/13/2023 01:05 AM    RBCUA 0-1 03/13/2023 01:05 AM    BLOODU TRACE-INTACT 03/13/2023 01:05 AM    SPECGRAV <=1.005 03/13/2023 01:05 AM    GLUCOSEU Negative 03/13/2023 01:05 AM       Imaging:  CT CHEST W CONTRAST    Result Date: 3/16/2023  EXAMINATION: CT OF THE CHEST WITH CONTRAST 3/16/2023 9:08 am TECHNIQUE: CT of the chest was performed with the administration of intravenous contrast. Multiplanar reformatted images are provided for review. Automated exposure control, iterative reconstruction, and/or weight based adjustment of the mA/kV was utilized to reduce the radiation dose to as low as reasonably achievable. COMPARISON: None. HISTORY: ORDERING SYSTEM PROVIDED HISTORY: metastatic work up TECHNOLOGIST PROVIDED HISTORY: Reason for exam:->metastatic work up What reading provider will be dictating this exam?->CRC FINDINGS: Mediastinum: No mediastinal adenopathy noted.  Thyroid gland is unremarkable. Thoracic aorta is normal caliber. Lungs/pleura: No pleural effusion identified.  There are minimal ground-glass changes in the right upper lobe.  This may be inflammatory.  There is a 3 mm pulmonary nodule in the right lung apex (axial image 28).  There is linear scarring in the anterior right lung apex.  No other suspicious lung nodule identified. Upper Abdomen: Diffuse decreased liver density is consistent with hepatic steatosis. Soft Tissues/Bones: Unremarkable     1. 3 mm right apical lung nodule. 2. Minimal ground-glass changes in the right upper lobe which may be inflammatory. RECOMMENDATIONS: Fleischner Society guidelines for follow-up and management of incidentally detected pulmonary nodules: Single Solid Nodule: Nodule size less than 6 mm In a low-risk patient, no routine follow-up. In a high-risk patient, optional CT at 12 months. - Low risk patients include individuals with minimal or absent history of smoking and other known risk factors. - High risk patients include individuals with a history or  smoking or known risk factors. Radiology 2017 http://pubs. rsna.org/doi/full/10.1148/radiol. 3996490207     CT ABDOMEN PELVIS W IV CONTRAST Additional Contrast? None    Result Date: 3/13/2023  EXAMINATION: CT OF THE ABDOMEN AND PELVIS WITH CONTRAST 3/13/2023 2:50 am TECHNIQUE: CT of the abdomen and pelvis was performed with the administration of intravenous contrast. Multiplanar reformatted images are provided for review. Automated exposure control, iterative reconstruction, and/or weight based adjustment of the mA/kV was utilized to reduce the radiation dose to as low as reasonably achievable. COMPARISON: 02/18/2023 HISTORY: ORDERING SYSTEM PROVIDED HISTORY: abdominal pain, vomiting, hernia TECHNOLOGIST PROVIDED HISTORY: Reason for exam:->abdominal pain, vomiting, hernia Additional Contrast?->None Decision Support Exception - unselect if not a suspected or confirmed emergency medical condition->Emergency Medical Condition (MA) What reading provider will be dictating this exam?->CRC FINDINGS: Lower Chest: No acute process in the lung bases. There are coronary artery calcifications. Organs: The liver, spleen, pancreas, kidneys and adrenal glands are without acute findings. The liver is diffusely hypoattenuating consistent with hepatic steatosis. The gallbladder is present without radiopaque cholelithiasis. GI/Bowel: Small hiatal hernia. No mechanical bowel obstruction. Normal appendix. Mild colonic mucosal edema. Pelvis: The urinary bladder is partially distended without contour abnormality. Prostate and seminal vesicles are without acute findings. Small fat containing bilateral inguinal hernias. Peritoneum/Retroperitoneum: No ascites or pneumoperitoneum. Mild atherosclerotic plaque. No retroperitoneal or mesenteric lymphadenopathy. Bones/Soft Tissues: Small fat containing umbilical hernia. No acute bony abnormality. 1. Colonic mucosal edema suggestive of acute colitis. 2. Hepatic steatosis.      CT ABDOMEN PELVIS W IV CONTRAST Additional Contrast? None    Result Date: 2/18/2023  EXAMINATION: CT OF THE ABDOMEN AND PELVIS WITH CONTRAST 2/18/2023 3:29 pm TECHNIQUE: CT of the abdomen and pelvis was performed with the administration of intravenous contrast. Multiplanar reformatted images are provided for review. Automated exposure control, iterative reconstruction, and/or weight based adjustment of the mA/kV was utilized to reduce the radiation dose to as low as reasonably achievable. COMPARISON: 04/07/2012 HISTORY: ORDERING SYSTEM PROVIDED HISTORY: Epigastric abd pain, n/v TECHNOLOGIST PROVIDED HISTORY: Reason for exam:->Epigastric abd pain, n/v Additional Contrast?->None Decision Support Exception - unselect if not a suspected or confirmed emergency medical condition->Emergency Medical Condition (MA) What reading provider will be dictating this exam?->CRC FINDINGS: Lower Chest:  Visualized portion of the lower chest demonstrates no acute abnormality. Mild elevation of the left hemidiaphragm, chronic. Organs: Liver and spleen appear normal in size without focal lesion. There are findings compatible with mild adrenal hypertrophy, unchanged. Kidneys demonstrate normal cortical enhancement. No renal calculus or obstruction. GI/Bowel: No evidence of bowel obstruction or inflammation. The appendix appears normal.  Prominent submucosal fat in the right colon may be a normal finding but can also be related to chronic colitis. No mesenteric adenopathy or mass identified. Pelvis: No free pelvic fluid. Prostate gland is normal for age. Urinary bladder is partially distended and appears normal.  There is a fat containing left inguinal hernia. Peritoneum/Retroperitoneum: No retroperitoneal adenopathy or mass. Aorta is nonaneurysmal.  Tiny fat containing umbilical hernia. Bones/Soft Tissues: Bone mineral density is within normal limits. There is sacralization of L5.   Degenerative disc disease at L4-5 with retrolisthesis of 4 on 5 and vacuum disc change. 1.  No acute process identified in the abdomen or pelvis. 2 stable fat containing left inguinal hernia. Tiny umbilical hernia. XR CHEST PORTABLE    Result Date: 3/13/2023  EXAMINATION: ONE XRAY VIEW OF THE CHEST 3/13/2023 1:36 am COMPARISON: 02/18/2022 HISTORY: ORDERING SYSTEM PROVIDED HISTORY: vomiting TECHNOLOGIST PROVIDED HISTORY: Reason for exam:->vomiting What reading provider will be dictating this exam?->CRC FINDINGS: The lungs are without acute focal process. There is no effusion or pneumothorax. The cardiomediastinal silhouette is without acute process. The osseous structures are without acute process. No acute process. XR CHEST PORTABLE    Result Date: 2/18/2023  EXAMINATION: ONE XRAY VIEW OF THE CHEST 2/18/2023 2:07 pm COMPARISON: None. HISTORY: ORDERING SYSTEM PROVIDED HISTORY: abd pain, n/v TECHNOLOGIST PROVIDED HISTORY: Reason for exam:->abd pain, n/v What reading provider will be dictating this exam?->CRC FINDINGS: The lungs are without acute focal process. There is no effusion or pneumothorax. The cardiomediastinal silhouette is without acute process. The osseous structures are without acute process. No acute process. Discharge Medications:      Medication List      You have not been prescribed any medications. Time Spent on discharge is more than 35 minutes in the examination, evaluation, counseling and review of medications and discharge plan.    +++++++++++++++++++++++++++++++++++++++++++++++++  Eliseo Pena MD  [address]  +++++++++++++++++++++++++++++++++++++++++++++++++  NOTE: This report was transcribed using voice recognition software. Every effort was made to ensure accuracy; however, inadvertent computerized transcription errors may be present.

## 2023-03-16 NOTE — PROGRESS NOTES
[facility]   ATTENDING PHYSICIAN PROGRESS NOTE     I have examined the patient, reviewed the record, and discussed the case with the APN/ Resident. I have reviewed all relevant labs and imaging data. Please refer to the APN/ resident's note. I agree with the assessment and plan with the following corrections/ additions. The following summarizes my clinical findings and independent assessment. CC: colitis; lower GI bleed    Pt states he wants to go home today. I informed him of findings from scopes yesterday. Told him we would like to get additional imaging and he can leave after that. Pt agreeable to stay for imaging as long as he can have diet and be discharged this afternoon. Objective         Vitals Last 24 Hours:  TEMPERATURE:  Temp  Av.1 °F (36.7 °C)  Min: 97 °F (36.1 °C)  Max: 99 °F (37.2 °C)  RESPIRATIONS RANGE: Resp  Av.9  Min: 14  Max: 21  PULSE OXIMETRY RANGE: SpO2  Av.1 %  Min: 98 %  Max: 100 %  PULSE RANGE: Pulse  Av.3  Min: 70  Max: 115  BLOOD PRESSURE RANGE: Systolic (89DPE), WLJ:473 , Min:116 , IYK:673   ; Diastolic (49ZYA), TPQ:26, Min:69, Max:123    I/O (24Hr): Intake/Output Summary (Last 24 hours) at 3/16/2023 1235  Last data filed at 3/15/2023 1353  Gross per 24 hour   Intake 600 ml   Output --   Net 600 ml       Objective:  General Appearance:  Comfortable, in no acute distress and ill-appearing. Vital signs: (most recent): Blood pressure (!) 154/123, pulse (!) 115, temperature 98.1 °F (36.7 °C), temperature source Oral, resp. rate 20, height 6' (1.829 m), weight 210 lb (95.3 kg), SpO2 98 %. Lungs:  Normal effort and normal respiratory rate. Breath sounds clear to auscultation. He is not in respiratory distress. Heart: Normal rate. Regular rhythm. No murmur. Abdomen: Abdomen is soft and non-distended. Bowel sounds are normal.   There is no abdominal tenderness.      Neurological: Patient is alert and oriented to person, place and time.    Skin:  Warm and dry. Labs/Imaging/Diagnostics    Labs:  personally reviewed  CBC:  Recent Labs     03/14/23  0442 03/15/23  0741 03/16/23  0612   WBC 8.9 7.9 9.2   RBC 3.20* 3.45* 3.65*   HGB 10.8* 11.6* 12.3*   HCT 31.0* 33.2* 36.0*   MCV 96.9 96.2 98.6   RDW 15.8* 15.9* 16.4*    154 150       CHEMISTRIES:  Recent Labs     03/14/23  0442 03/14/23  1508 03/15/23  0741 03/16/23  0612     --  135 138   K 2.8* 3.3* 2.9* 2.8*     --  98 99   CO2 24  --  22 23   BUN 4*  --  2* <2*   CREATININE 0.5*  --  0.5* 0.6*   GLUCOSE 105*  --  98 107*   MG 1.6  --  1.9 1.5*       PT/INR:No results for input(s): PROTIME, INR in the last 72 hours. APTT:No results for input(s): APTT in the last 72 hours. LIVER PROFILE:  No results for input(s): AST, ALT, BILIDIR, BILITOT, ALKPHOS in the last 72 hours. Imaging Last 24 Hours:  CT ABDOMEN PELVIS W IV CONTRAST Additional Contrast? None    Result Date: 3/13/2023  EXAMINATION: CT OF THE ABDOMEN AND PELVIS WITH CONTRAST 3/13/2023 2:50 am TECHNIQUE: CT of the abdomen and pelvis was performed with the administration of intravenous contrast. Multiplanar reformatted images are provided for review. Automated exposure control, iterative reconstruction, and/or weight based adjustment of the mA/kV was utilized to reduce the radiation dose to as low as reasonably achievable. COMPARISON: 02/18/2023 HISTORY: ORDERING SYSTEM PROVIDED HISTORY: abdominal pain, vomiting, hernia TECHNOLOGIST PROVIDED HISTORY: Reason for exam:->abdominal pain, vomiting, hernia Additional Contrast?->None Decision Support Exception - unselect if not a suspected or confirmed emergency medical condition->Emergency Medical Condition (MA) What reading provider will be dictating this exam?->CRC FINDINGS: Lower Chest: No acute process in the lung bases. There are coronary artery calcifications. Organs: The liver, spleen, pancreas, kidneys and adrenal glands are without acute findings.   The liver is diffusely hypoattenuating consistent with hepatic steatosis. The gallbladder is present without radiopaque cholelithiasis. GI/Bowel: Small hiatal hernia. No mechanical bowel obstruction. Normal appendix. Mild colonic mucosal edema. Pelvis: The urinary bladder is partially distended without contour abnormality. Prostate and seminal vesicles are without acute findings. Small fat containing bilateral inguinal hernias. Peritoneum/Retroperitoneum: No ascites or pneumoperitoneum. Mild atherosclerotic plaque. No retroperitoneal or mesenteric lymphadenopathy. Bones/Soft Tissues: Small fat containing umbilical hernia. No acute bony abnormality. 1. Colonic mucosal edema suggestive of acute colitis. 2. Hepatic steatosis. XR CHEST PORTABLE    Result Date: 3/13/2023  EXAMINATION: ONE XRAY VIEW OF THE CHEST 3/13/2023 1:36 am COMPARISON: 02/18/2022 HISTORY: ORDERING SYSTEM PROVIDED HISTORY: vomiting TECHNOLOGIST PROVIDED HISTORY: Reason for exam:->vomiting What reading provider will be dictating this exam?->CRC FINDINGS: The lungs are without acute focal process. There is no effusion or pneumothorax. The cardiomediastinal silhouette is without acute process. The osseous structures are without acute process. No acute process.      Assessment//Plan           Hospital Problems             Last Modified POA    * (Principal) Generalized abdominal pain 3/13/2023 Yes    Gastrointestinal hemorrhage 3/13/2023 Yes    Colitis 3/13/2023 Yes    Rectal mass 3/13/2023 Yes    Epigastric hernia 7/02/4269 Yes    Umbilical hernia without obstruction and without gangrene 3/13/2023 Yes     Gastritis/hiatal hernia/duodenitis/duodenal polyps--cont PPI  Colon polyps/rectal mass--await biopsy results  Check CEA  CT chest  Will need MRI pelvis  Diet as tolerated  EtOH abuse--monitor for withdrawal symptoms    Kary Montenegro MD, FACS  3/16/2023  12:35 PM

## 2023-03-16 NOTE — PLAN OF CARE
Problem: Discharge Planning  Goal: Discharge to home or other facility with appropriate resources  Outcome: Progressing     Problem: Pain  Goal: Verbalizes/displays adequate comfort level or baseline comfort level  Outcome: Progressing     Problem: Safety - Adult  Goal: Free from fall injury  Outcome: Progressing     Problem: Risk for Elopement  Goal: Patient will not exit the unit/facility without proper excort  Outcome: Progressing  Flowsheets (Taken 3/15/2023 2000)  Nursing Interventions for Elopement Risk:   Assist with personal care needs such as toileting, eating, dressing, as needed to reduce the risk of wandering   Collaborate with family members/caregivers to mitigate the elopement risk

## 2023-03-16 NOTE — PROGRESS NOTES
CC: colitis; lower GI bleed    S: No acute events. No bleeding. Tolerating his diet. No abdominal pain. Objective         Vitals Last 24 Hours:  TEMPERATURE:  Temp  Av.1 °F (36.7 °C)  Min: 97 °F (36.1 °C)  Max: 99 °F (37.2 °C)  RESPIRATIONS RANGE: Resp  Av.9  Min: 14  Max: 21  PULSE OXIMETRY RANGE: SpO2  Av.1 %  Min: 98 %  Max: 100 %  PULSE RANGE: Pulse  Av.3  Min: 70  Max: 105  BLOOD PRESSURE RANGE: Systolic (87FJN), HUC:280 , Min:116 , IMQ:553   ; Diastolic (25GJF), WYX:17, Min:69, Max:109    I/O (24Hr): Intake/Output Summary (Last 24 hours) at 3/16/2023 0648  Last data filed at 3/15/2023 1353  Gross per 24 hour   Intake 600 ml   Output --   Net 600 ml       Objective:  General Appearance:  Comfortable, in no acute distress and ill-appearing. Vital signs: (most recent): Blood pressure (!) 127/96, pulse (!) 105, temperature 99 °F (37.2 °C), temperature source Oral, resp. rate 18, height 6' (1.829 m), weight 210 lb (95.3 kg), SpO2 99 %. Lungs:  Normal effort and normal respiratory rate. Breath sounds clear to auscultation. He is not in respiratory distress. Heart: Normal rate. Regular rhythm. Abdomen: Abdomen is soft. Bowel sounds are normal.   There is no abdominal tenderness. Neurological: Patient is alert and oriented to person, place and time. Skin:  Warm and dry. Labs/Imaging/Diagnostics    Labs:  personally reviewed  CBC:  Recent Labs     23  0442 03/15/23  0741 23  0612   WBC 8.9 7.9 9.2   RBC 3.20* 3.45* 3.65*   HGB 10.8* 11.6* 12.3*   HCT 31.0* 33.2* 36.0*   MCV 96.9 96.2 98.6   RDW 15.8* 15.9* 16.4*    154 150       CHEMISTRIES:  Recent Labs     23  0442 23  1508 03/15/23  0741     --  135   K 2.8* 3.3* 2.9*     --  98   CO2 24  --  22   BUN 4*  --  2*   CREATININE 0.5*  --  0.5*   GLUCOSE 105*  --  98   MG 1.6  --  1.9       PT/INR:No results for input(s): PROTIME, INR in the last 72 hours.   APTT:No results Jessica Herman (:  1952) is a 77 y.o. male,Established patient, here for evaluation of the following chief complaint(s):  Follow-up (cyst is still seeping) and Hypertension         ASSESSMENT/PLAN:  1. Essential hypertension  -     lisinopril (PRINIVIL;ZESTRIL) 5 MG tablet; Take 1 tablet by mouth daily, Disp-90 tablet, R-3Normal  -     Comprehensive Metabolic Panel; Future  -     Lipid Panel; Future  2. Perianal cyst  -     [address], Pipe Lakhani DO, General Surgery, [facility]  3. Hyperlipidemia, unspecified hyperlipidemia type  -     Comprehensive Metabolic Panel; Future  -     Lipid Panel; Future  4. Insomnia, unspecified type  -     Comprehensive Metabolic Panel; Future  -     Lipid Panel; Future    Did not examine previous perianal issues, may be fistula now. Due to not healing, referral.     BP not well controlled  Needs f/u at home or here. lisinpril dose may need increase  Will try magnesium supplementation for sleep first.  Remove extra sugar and processed food as much as possible. Return in about 6 months (around 2022). Future Appointments   Date Time Provider Chaim Kincaid   2021  1:00 PM Cuco Orantes DO [facility] [address]   2021  8:00 AM SCHEDULE, [facility]   2022  8[facility]20 [facility]        Subjective   SUBJECTIVE/OBJECTIVE:  HPI    HtN and high cholesterol --  -- since  started a diet. -- crestor stopped on , had pain in legs with medication. -- cut out a lot of carbohydrates. -- reducing processed foods  -- salads 4 times a day    perinanal cyst -- last seen 2021. Infection had resolved, was improving. However now, nothing is changing. He does extra cleaning with alcohol/peroxide. It can drain clear or blood. Sleep trouble. Low carb diet tried w/o help. Hard to fall asleep. Not used magnesium. Not tried melatonin.     Wt Readings from Last 3 Encounters: 11/16/21 208 lb (94.3 kg)   06/11/21 208 lb (94.3 kg)   06/08/21 211 lb (95.7 kg)     No TIA/MI. Review of Systems   Constitutional: Negative for fatigue and fever. Respiratory: Negative for shortness of breath. Cardiovascular: Negative for chest pain and leg swelling. Objective   Physical Exam  Constitutional:       General: He is not in acute distress. Appearance: Normal appearance. He is not ill-appearing. Cardiovascular:      Rate and Rhythm: Normal rate and regular rhythm. Heart sounds: No murmur heard. Pulmonary:      Effort: Pulmonary effort is normal. No respiratory distress. Breath sounds: Normal breath sounds. No wheezing. Musculoskeletal:         General: No swelling. Neurological:      Mental Status: He is alert and oriented to person, place, and time.    Psychiatric:         Mood and Affect: Mood normal.         Behavior: Behavior normal.          No results found for: LABA1C  Lab Results   Component Value Date     05/26/2021    K 4.8 05/26/2021     05/26/2021    CO2 27 05/26/2021    BUN 16 05/26/2021    CREATININE 0.9 05/26/2021    GLUCOSE 101 05/26/2021    CALCIUM 9.6 05/26/2021        Lab Results   Component Value Date    CHOL 161 05/26/2021    CHOL 181 12/27/2019    CHOL 184 06/26/2018     Lab Results   Component Value Date    TRIG 92 05/26/2021    TRIG 89 12/27/2019    TRIG 109 06/26/2018     Lab Results   Component Value Date    HDL 40 05/26/2021    HDL 51 12/27/2019    HDL 46 06/26/2018     Lab Results   Component Value Date    LDLCALC 103 05/26/2021    LDLCALC 112 12/27/2019    LDLCALC 116 06/26/2018     Lab Results   Component Value Date    LABVLDL 36 (H) 10/11/2016    LABVLDL 28 06/30/2015     Lab Results   Component Value Date    CHOLHDLRATIO 5.6 (H) 10/11/2016    CHOLHDLRATIO 5.2 (H) 06/30/2015     The 10-year ASCVD risk score (Luis Combs et al., 2013) is: 23.7%    Values used to calculate the score:      Age: 77 years      Sex: Male for input(s): APTT in the last 72 hours. LIVER PROFILE:  No results for input(s): AST, ALT, BILIDIR, BILITOT, ALKPHOS in the last 72 hours. Imaging Last 24 Hours:  No new imaging     Assessment//Plan           Hospital Problems             Last Modified POA    * (Principal) Generalized abdominal pain 3/13/2023 Yes    Gastrointestinal hemorrhage 3/13/2023 Yes    Colitis 3/13/2023 Yes    Rectal mass 3/13/2023 Yes    Epigastric hernia 3/90/4065 Yes    Umbilical hernia without obstruction and without gangrene 3/13/2023 Yes         Patient underwent EGD/colonoscopy on 3/15: EGD showed gastritis and a duodenal polyp. Colonoscopy showed a colonic polyp at 60 cm that was removed, multiple polyps at 15 cm, and a large rectal mass. Biopsies were sent.     Staging work-up  Okay to discharge once staging complete  Okay for diet from surgical perspective    Electronically signed by Alfredito Flanagan MD on 3/16/2023 at 6:48 AM

## 2023-03-24 ENCOUNTER — TELEPHONE (OUTPATIENT)
Dept: SURGERY | Age: 53
End: 2023-03-24

## 2023-03-24 NOTE — TELEPHONE ENCOUNTER
MA attempted to contact pt to schedule follow up appt with Dr. Letta Cogan per advisement. Pt phone is disconnected. MA mailed letter with Dr. Mekhi Stout advisement to schedule follow up appt.     Electronically signed by Malachi Wilson MA on 3/24/2023 at 9:06 AM

## 2023-04-25 NOTE — PROGRESS NOTES
Physician Progress Note      PATIENT:               Coni Lopez  CSN #:                  [id number]  :                       1970  ADMIT DATE:       3/13/2023 12:25 AM  Eleonora Dong DATE:        3/16/2023 11:25 AM  RESPONDING  PROVIDER #:        Colonel Baljinder HAQUE          QUERY TEXT:    Dear Dr. Yaya Marrero,    Pt admitted with intractable abdominal pain. Pt noted to have \"Concern for   Colitis\", alcohol withdrawal, hematochezia. If possible, please document in   progress notes and discharge summary if you are evaluating and /or treating   any of the following: The medical record reflects the following:  Risk Factors: Alcohol abuse  Clinical Indicators: Per Discharge summary: \". ..admitted with intractable   abdominal pain. Epigastric region he does drink 4-5 beers   daily. ..hematochezia and intractable nausea vomiting. ..CT abdomen pelvis that   showed concern for colitis 6. ..noted to have lactic acidosis and   leukocytosis. ..gastritis; large hiatal hernia; duodenitis/duodenal polyps; 1.5   cm colon polyp at 60 cm; two 3 mm polyps at 15 cm; rectal mass\"  Treatment: Rocephin, Flagyl, Close pt monitoring, Serial labs    Thank you,  Daniella KILPATRICK, RN  Clinical Documentation Improvement  [employer]@"[employer], [employer]". com  Options provided:  -- Bacterial Colitis  -- Colitis with GI hemorrhage  -- GI Hemorrhage due to please provide possible cause, (Please provide   possible cause ). -- Abdominal Pain due to alcohol consumption  -- Other - I will add my own diagnosis  -- Disagree - Not applicable / Not valid  -- Disagree - Clinically unable to determine / Unknown  -- Refer to Clinical Documentation Reviewer    PROVIDER RESPONSE TEXT:    This patient has abdominal pain due to alcohol consumption.     Query created by: Roberto Richardson on 2023 10:58 AM      Electronically signed by:  Colonel Baljinder HAQUE 2023 9:14 PM

## (undated) DEVICE — ELECTRODE PT RET AD L9FT HI MOIST COND ADH HYDRGEL CORDED

## (undated) DEVICE — BITEBLOCK 54FR W/ DENT RIM BLOX

## (undated) DEVICE — SNARE ENDOSCP L240CM W15MM SHTH DIA2.4MM CHN 2.8MM STIFF

## (undated) DEVICE — CONTAINER SPEC 60ML PH 7NEUTRAL BUFF FRMLN RDY TO USE

## (undated) DEVICE — CONNECTOR IRRIGATION AUXILIARY H2O JET W/ PRT MTL THRD HYDR

## (undated) DEVICE — FORCEPS BX L240CM JAW DIA2.4MM WRK CHN 2.8MM ORNG L CAP W/

## (undated) DEVICE — Z DISCONTINUED NO SUB IDED TUBING ETCO2 AD L6.5FT NSL ORAL CVD PRNG NONFLARED TIP OVR

## (undated) DEVICE — FORCEPS BX L160CM JAW DIA2.4MM YEL L CAP W/ NDL DISP RAD

## (undated) DEVICE — DEFENDO AIR WATER SUCTION AND BIOPSY VALVE KIT FOR  OLYMPUS: Brand: DEFENDO AIR/WATER/SUCTION AND BIOPSY VALVE

## (undated) DEVICE — TRAP POLYP ETRAP

## (undated) DEVICE — SNARE ENDOSCP L240CM SHTH DIA2.4MM LOOP W30MM MIN WRK CHN

## (undated) DEVICE — GAUZE,SPONGE,4"X4",8PLY,STRL,LF,10/TRAY: Brand: MEDLINE